# Patient Record
Sex: FEMALE | Race: WHITE | NOT HISPANIC OR LATINO | Employment: OTHER | URBAN - METROPOLITAN AREA
[De-identification: names, ages, dates, MRNs, and addresses within clinical notes are randomized per-mention and may not be internally consistent; named-entity substitution may affect disease eponyms.]

---

## 2024-02-02 PROBLEM — I48.91 ATRIAL FIBRILLATION (MULTI): Status: ACTIVE | Noted: 2024-02-02

## 2024-02-02 PROBLEM — I47.19 AVNRT (AV NODAL RE-ENTRY TACHYCARDIA) (CMS-HCC): Status: ACTIVE | Noted: 2024-02-02

## 2024-02-02 PROBLEM — I65.23 BILATERAL CAROTID ARTERY STENOSIS: Status: ACTIVE | Noted: 2024-02-02

## 2024-02-02 PROBLEM — R07.9 CHEST PAIN: Status: ACTIVE | Noted: 2024-02-02

## 2024-02-02 PROBLEM — I35.1 AORTIC VALVE REGURGITATION, ACQUIRED: Status: ACTIVE | Noted: 2024-02-02

## 2024-02-02 PROBLEM — R00.2 PALPITATIONS: Status: ACTIVE | Noted: 2024-02-02

## 2024-02-02 PROBLEM — K27.9 PUD (PEPTIC ULCER DISEASE): Status: ACTIVE | Noted: 2024-02-02

## 2024-02-02 PROBLEM — K21.9 GERD (GASTROESOPHAGEAL REFLUX DISEASE): Status: ACTIVE | Noted: 2024-02-02

## 2024-02-02 PROBLEM — I48.92 ATRIAL FLUTTER (MULTI): Status: ACTIVE | Noted: 2024-02-02

## 2024-02-02 RX ORDER — VIT C/E/ZN/COPPR/LUTEIN/ZEAXAN 250MG-90MG
25 CAPSULE ORAL DAILY
COMMUNITY

## 2024-02-02 RX ORDER — PRIMIDONE 50 MG/1
50 TABLET ORAL
COMMUNITY

## 2024-02-02 RX ORDER — MULTIVITAMIN
1 TABLET ORAL DAILY
COMMUNITY
End: 2024-04-10 | Stop reason: WASHOUT

## 2024-02-02 RX ORDER — ATENOLOL 25 MG/1
25 TABLET ORAL
COMMUNITY
Start: 2022-03-30

## 2024-02-02 RX ORDER — POLYETHYLENE GLYCOL 3350 17 G/17G
17 POWDER, FOR SOLUTION ORAL
COMMUNITY

## 2024-02-02 RX ORDER — POTASSIUM CITRATE 10 MEQ/1
10 TABLET, EXTENDED RELEASE ORAL DAILY
COMMUNITY

## 2024-02-02 RX ORDER — MV-MIN/FA/VIT K/LUTEIN/ZEAXANT 200MCG-5MG
1 CAPSULE ORAL 2 TIMES DAILY
COMMUNITY

## 2024-02-02 RX ORDER — GLUCOSAMINE/CHONDR SU A SOD 750-600 MG
1 TABLET ORAL DAILY
COMMUNITY

## 2024-02-02 RX ORDER — LANOLIN ALCOHOL/MO/W.PET/CERES
400 CREAM (GRAM) TOPICAL DAILY
COMMUNITY

## 2024-02-26 ENCOUNTER — OFFICE VISIT (OUTPATIENT)
Dept: CARDIOLOGY | Facility: CLINIC | Age: 79
End: 2024-02-26
Payer: MEDICARE

## 2024-02-26 VITALS
DIASTOLIC BLOOD PRESSURE: 60 MMHG | HEIGHT: 65 IN | SYSTOLIC BLOOD PRESSURE: 116 MMHG | WEIGHT: 124.6 LBS | BODY MASS INDEX: 20.76 KG/M2 | HEART RATE: 60 BPM

## 2024-02-26 DIAGNOSIS — R42 VERTIGO: ICD-10-CM

## 2024-02-26 DIAGNOSIS — K21.9 GASTROESOPHAGEAL REFLUX DISEASE, UNSPECIFIED WHETHER ESOPHAGITIS PRESENT: ICD-10-CM

## 2024-02-26 DIAGNOSIS — R55 NEAR SYNCOPE: ICD-10-CM

## 2024-02-26 DIAGNOSIS — N20.0 NEPHROLITHIASIS: ICD-10-CM

## 2024-02-26 DIAGNOSIS — K27.9 PUD (PEPTIC ULCER DISEASE): ICD-10-CM

## 2024-02-26 DIAGNOSIS — G25.0 ESSENTIAL TREMOR: ICD-10-CM

## 2024-02-26 DIAGNOSIS — I65.23 BILATERAL CAROTID ARTERY STENOSIS: ICD-10-CM

## 2024-02-26 DIAGNOSIS — R06.02 SHORTNESS OF BREATH: ICD-10-CM

## 2024-02-26 DIAGNOSIS — I47.19 AVNRT (AV NODAL RE-ENTRY TACHYCARDIA) (CMS-HCC): ICD-10-CM

## 2024-02-26 DIAGNOSIS — I48.92 ATRIAL FLUTTER, UNSPECIFIED TYPE (MULTI): ICD-10-CM

## 2024-02-26 DIAGNOSIS — I35.1 AORTIC VALVE REGURGITATION, ACQUIRED: ICD-10-CM

## 2024-02-26 DIAGNOSIS — R07.9 CHEST PAIN, UNSPECIFIED TYPE: ICD-10-CM

## 2024-02-26 DIAGNOSIS — Z78.9 NEVER SMOKED TOBACCO: ICD-10-CM

## 2024-02-26 PROCEDURE — 99214 OFFICE O/P EST MOD 30 MIN: CPT | Performed by: INTERNAL MEDICINE

## 2024-02-26 PROCEDURE — 1159F MED LIST DOCD IN RCRD: CPT | Performed by: INTERNAL MEDICINE

## 2024-02-26 PROCEDURE — 1036F TOBACCO NON-USER: CPT | Performed by: INTERNAL MEDICINE

## 2024-02-26 RX ORDER — ZINC GLUCONATE 100 MG
100 TABLET ORAL DAILY
COMMUNITY

## 2024-02-26 RX ORDER — CHOLECALCIFEROL (VITAMIN D3) 125 MCG
3000 CAPSULE ORAL
COMMUNITY

## 2024-02-26 NOTE — PROGRESS NOTES
CARDIOLOGY OFFICE VISIT      CHIEF COMPLAINT  Chief Complaint   Patient presents with    Follow-up     Patient presents with SOB and fatigue off and on.  She states that about a month ago she had an episode while exercising she got SOB and had to sit down, this happened again on Friday.        HISTORY OF PRESENT ILLNESS  The patient is a 78-year-old  female with past medical history significant for AV noah reentrant tachycardia, status post radiofrequency ablation 2004, atrial flutter, status post posterior corridor radiofrequency ablation 2005, who presents for follow-up cardiovascular care.     Patient has had a change in her functional status recently.  2 months ago while exercising she developed dyspnea and lightheadedness.  She felt near syncopal.  Last Friday she had recurrent dyspnea with exercising only 10 minutes.  She feels this is a significant change in her functional status.  This morning she felt shaky and was having some chest heaviness.  Denies palpitations, nausea, vomiting, edema, claudication.  She currently drinks approximately 64 ounces of water daily.  Home blood pressure readings are normal.     REVIEW OF SYSTEMS: Negative, noncontributory or as previously mentioned x 12 systems.      Past medical history:  As above plus  Nephrolithiasis  Essential tremor  Gastric soft reflux disease  Vertigo        PAST SURGICAL HISTORY   1. Lumbar diskectomy.  2. Varicose vein stripping.  3. Tonsils and adenoidectomy.  4. Toe surgery for bone spur.   5. Laser lithotripsy     SOCIAL HISTORY: Denies tobacco use. Occasional alcohol use.      FAMILY HISTORY: Mom alive at 90 with angina. Dad alive at 88 with several  cerebrovascular accidents, status post pacemaker.      ASSESSMENT:   Chest pain  Dyspnea  Near syncope  Atrioventricular noah reentrant tachycardia, status post radiofrequency ablation, 2004.  Atrial flutter, status post posterior corridor radiofrequency ablation, 2005.  Aortic valve  regurgitation  Carotid artery stenosis.  Peptic ulcer disease/bleeding ulcer status post clip July 2021  Essential tremor.  History of vertigo.  Gastroesophageal reflux disease - Dr Carlton [facility].  Nephrolithiasis -Dr Colon [facility]  Intolerance to aspirin due to bleeding ulcer       RECOMMENDATIONS:   To further evaluate recent symptoms obtain cardiac CTA of the coronary arteries, echocardiogram, 2-week event monitor.  Hold off exercising at this time.  Follow-up after testing.     Please excuse any errors in grammar or translation related to this dictation. Voice recognition software was utilized to prepare this document.    Recent Cardiovascular Testing:  The following results have been reviewed and updated.     CRD: 5/5/09  1. Mild carotid disease.     MPX: 5/6/09  1. Normal.  2. EF 75%.     Labs: 5/25/23  1.T C 241, , Trig 58, LDL 99  .     CTA Cardiac C. Score 6/7/16  1.Normal Coronary art.  2.Calcium score 0.     PATRICK: 11/13/18  1. No evidence of dysrhythmia.  2. No symptoms.     Echo: 11/13/18  1. EF 55%.  2. 1+ AI.     VITALS  Vitals:    02/26/24 1311   BP: 116/60   Pulse: 60     Wt Readings from Last 4 Encounters:   02/26/24 56.5 kg (124 lb 9.6 oz)   06/05/23 57.4 kg (126 lb 9 oz)   06/01/22 57.3 kg (126 lb 5 oz)       PHYSICAL EXAM:  GENERAL:  Well developed, well nourished, in no acute distress.  CHEST:  Symmetric and nontender.  NEURO/PSYCH:  Alert and oriented times three with approppriate behavior and responses.  NECK:  Supple, no JVD, no bruit.  LUNGS:  Clear to auscultation bilaterally, normal respiratory effort.  HEART:  Rate and rhythm regular with no evident murmur, no gallop appreciated.    cThere are no rubs, clicks or heaves.  EXTREMITIES:  Warm with good color, no clubbing or cyanosis.  There is no edema noted.  PERIPHERAL VASCULAR:  Pulses present and equally palpable; 2+ throughout.    ASSESSMENT AND PLAN  Diagnoses and all orders for this visit:  AVNRT (AV noah re-entry  tachycardia)  -     Holter or Event Cardiac Monitor; Future  Atrial flutter, unspecified type (CMS/HCC)  Aortic valve regurgitation, acquired  Bilateral carotid artery stenosis  PUD (peptic ulcer disease)  Essential tremor  Vertigo  Gastroesophageal reflux disease, unspecified whether esophagitis present  Nephrolithiasis  BMI 21.0-21.9, adult  Never smoked tobacco  Chest pain, unspecified type  -     CT angio coronary art with heartflow if score >30%; Future  -     Transthoracic Echo (TTE) Complete; Future  -     Holter or Event Cardiac Monitor; Future  -     Creatinine; Future  Shortness of breath  -     CT angio coronary art with heartflow if score >30%; Future  -     Transthoracic Echo (TTE) Complete; Future  -     Holter or Event Cardiac Monitor; Future  -     Creatinine; Future  Near syncope  -     CT angio coronary art with heartflow if score >30%; Future  -     Transthoracic Echo (TTE) Complete; Future  -     Holter or Event Cardiac Monitor; Future  -     Creatinine; Future      Past Medical History  Past Medical History:   Diagnosis Date    Abnormal ECG     Sleep apnea        Social History  Social History     Tobacco Use    Smoking status: Never    Smokeless tobacco: Never   Substance Use Topics    Alcohol use: Yes     Comment: Only have a drink several times a year    Drug use: Never       Family History     Family History   Problem Relation Name Age of Onset    Hypertension Mother Celia Ocampo     Other (angina pectoris) Mother Celia Ocampo     Angina Mother Celia Ocampo     Stroke Father Ty Maciasprashant     Other (CABG) Father Ty WOMACK Yeimyjimprashant     Other (heart problem) Father Ty Maciasprashant     Other (PTCA) Father Ty Maciasprashant     Diabetes type I Father's Sister Sharmila         Allergies:  Allergies   Allergen Reactions    Ciprofloxacin Unknown    Propranolol Unknown        Outpatient Medications:  Current Outpatient Medications   Medication Instructions    atenolol (TENORMIN) 25  "mg, oral,  TAKE 1 TABLET in the morning and 1/2 tablet in the evening    biotin 2,500 mcg capsule 1 capsule, oral, Daily    CALCIUM CITRATE-VITAMIN D3 ORAL oral, TAKE PO AS DIRECTED PER PACKAGE INSTRUCTIONS.    cholecalciferol (VITAMIN D-3) 25 mcg, oral, Daily    DIETARY SUPPLEMENT ORAL 1 capsule, oral, Daily, As needed    L. acidophilus/Bifid. animalis 32 billion cell capsule 1 capsule, oral, Daily    lactase (LACTAID) 3,000 Units, oral, 3 times daily with meals    magnesium oxide (MAG-OX) 400 mg, oral, Daily    multivitamin tablet 1 tablet, oral, Daily    mv-min-FA-vit K-lutein-zeaxant (PreserVision AREDS 2 Plus MV) 200 mcg-15 mcg- 5 mg-1 mg capsule 1 capsule, oral, Daily    polyethylene glycol (MIRALAX) 17 g, oral, Daily RT    potassium citrate CR (Urocit-K-10) 10 mEq ER tablet 10 mEq, oral, Daily    primidone (MYSOLINE) 50 mg, oral,  Take 2 tablets at lunch and 4 tabs at HS    pseudoephed/acetaminoph/diphen (MEDI-TABS ALLERGY SINUS ORAL) 1 tablet, oral, Daily, As needed    SIMETHICONE ORAL 125 mg, oral, Daily, As needed    vitamin k 100 mcg, oral, Daily        Recent Lab Results:    CMP:    No results found for: \"NA\", \"K\", \"CL\", \"CO2\", \"BUN\", \"CREATININE\", \"AGRATIO\", \"GLUCOSE\", \"GLU\", \"CALCIUM\"    Magnesium:    No results found for: \"MG\"    Lipid Profile:    No results found for: \"CHLPL\", \"TRIG\", \"HDL\", \"LDLCALC\", \"LDLDIRECT\"    TSH:    No results found for: \"TSH\"    BNP:   No results found for: \"BNP\"     PT/INR:    No results found for: \"PROTIME\", \"INR\"    HgBA1c:    No results found for: \"HGBA1C\"    BMP:  No results found for: \"NA\", \"K\", \"CL\", \"CO2\", \"BUN\", \"CREATININE\", \"GLU\"    CBC:  No results found for: \"WBC\", \"RBC\", \"HGB\", \"HCT\", \"MCV\", \"MCH\", \"MCHC\", \"RDW\", \"PLT\", \"MPV\"    Cardiac Enzymes:    No results found for: \"TROPHS\"    Hepatic Function Panel:    No results found for: \"ALKPHOS\", \"ALT\", \"AST\", \"PROT\", \"BILITOT\", \"BILIDIR\"        Edinson Paige, DO        "

## 2024-02-26 NOTE — PATIENT INSTRUCTIONS
Patient to follow up after testing with Dr. Royal DO     Office will arrange Catscan Angiogram of Coronaries in near future.   Please complete lab work prior to testing- orders in system.     Office will also arrange Echo and 48hour holter monitor in near future.     Encouraged to monitor blood pressure.     No other changes today.   Continue same medications and treatments.   Patient educated on proper medication use.   Patient educated on risk factor modification.   Please bring any lab results from other providers / physicians to your next appointment.     Please bring all medicines, vitamins, and herbal supplements with you when you come to the office.     Prescriptions will not be filled unless you are compliant with your follow up appointments or have a follow up appointment scheduled as per instruction of your physician. Refills should be requested at the time of your visit.    INaveen RN am scribing for and in the presence of Dr. Edinson Paige DO

## 2024-03-04 ENCOUNTER — LAB (OUTPATIENT)
Dept: LAB | Facility: LAB | Age: 79
End: 2024-03-04
Payer: MEDICARE

## 2024-03-04 DIAGNOSIS — R55 NEAR SYNCOPE: ICD-10-CM

## 2024-03-04 DIAGNOSIS — R07.9 CHEST PAIN, UNSPECIFIED TYPE: ICD-10-CM

## 2024-03-04 DIAGNOSIS — R06.02 SHORTNESS OF BREATH: ICD-10-CM

## 2024-03-04 LAB
CREAT SERPL-MCNC: 0.81 MG/DL (ref 0.5–1.05)
EGFRCR SERPLBLD CKD-EPI 2021: 75 ML/MIN/1.73M*2

## 2024-03-04 PROCEDURE — 82565 ASSAY OF CREATININE: CPT

## 2024-03-04 PROCEDURE — 36415 COLL VENOUS BLD VENIPUNCTURE: CPT

## 2024-03-08 ENCOUNTER — HOSPITAL ENCOUNTER (OUTPATIENT)
Dept: RADIOLOGY | Facility: CLINIC | Age: 79
Discharge: HOME | End: 2024-03-08
Payer: MEDICARE

## 2024-03-08 VITALS
HEART RATE: 67 BPM | OXYGEN SATURATION: 100 % | SYSTOLIC BLOOD PRESSURE: 127 MMHG | DIASTOLIC BLOOD PRESSURE: 72 MMHG | RESPIRATION RATE: 20 BRPM

## 2024-03-08 DIAGNOSIS — R06.02 SHORTNESS OF BREATH: ICD-10-CM

## 2024-03-08 DIAGNOSIS — R55 NEAR SYNCOPE: ICD-10-CM

## 2024-03-08 DIAGNOSIS — R07.9 CHEST PAIN, UNSPECIFIED TYPE: ICD-10-CM

## 2024-03-08 PROCEDURE — 2550000001 HC RX 255 CONTRASTS: Performed by: INTERNAL MEDICINE

## 2024-03-08 PROCEDURE — 75574 CT ANGIO HRT W/3D IMAGE: CPT

## 2024-03-08 PROCEDURE — 75574 CT ANGIO HRT W/3D IMAGE: CPT | Performed by: INTERNAL MEDICINE

## 2024-03-08 PROCEDURE — 2500000001 HC RX 250 WO HCPCS SELF ADMINISTERED DRUGS (ALT 637 FOR MEDICARE OP): Performed by: INTERNAL MEDICINE

## 2024-03-08 RX ORDER — NITROGLYCERIN 400 UG/1
2 SPRAY ORAL ONCE
Status: COMPLETED | OUTPATIENT
Start: 2024-03-08 | End: 2024-03-08

## 2024-03-08 RX ADMIN — IOHEXOL 80 ML: 350 INJECTION, SOLUTION INTRAVENOUS at 14:11

## 2024-03-08 RX ADMIN — NITROGLYCERIN 2 SPRAY: 400 SPRAY ORAL at 13:28

## 2024-03-15 ENCOUNTER — ANCILLARY PROCEDURE (OUTPATIENT)
Dept: CARDIOLOGY | Facility: CLINIC | Age: 79
End: 2024-03-15
Payer: MEDICARE

## 2024-03-15 ENCOUNTER — HOSPITAL ENCOUNTER (OUTPATIENT)
Dept: CARDIOLOGY | Facility: CLINIC | Age: 79
Discharge: HOME | End: 2024-03-15
Payer: MEDICARE

## 2024-03-15 VITALS
BODY MASS INDEX: 20.66 KG/M2 | HEIGHT: 65 IN | SYSTOLIC BLOOD PRESSURE: 118 MMHG | WEIGHT: 124 LBS | DIASTOLIC BLOOD PRESSURE: 70 MMHG

## 2024-03-15 DIAGNOSIS — R06.02 SHORTNESS OF BREATH: ICD-10-CM

## 2024-03-15 DIAGNOSIS — R07.9 CHEST PAIN, UNSPECIFIED TYPE: ICD-10-CM

## 2024-03-15 DIAGNOSIS — R55 NEAR SYNCOPE: ICD-10-CM

## 2024-03-15 DIAGNOSIS — I47.19 AVNRT (AV NODAL RE-ENTRY TACHYCARDIA): ICD-10-CM

## 2024-03-15 LAB
AORTIC VALVE MEAN GRADIENT: 4 MMHG
AORTIC VALVE PEAK VELOCITY: 1.3 M/S
AV PEAK GRADIENT: 6.8 MMHG
AVA (PEAK VEL): 2.13 CM2
AVA (VTI): 2.14 CM2
EJECTION FRACTION APICAL 4 CHAMBER: 58.1
LEFT VENTRICLE INTERNAL DIMENSION DIASTOLE: 4.49 CM (ref 3.5–6)
LEFT VENTRICULAR OUTFLOW TRACT DIAMETER: 1.9 CM
MITRAL VALVE E/A RATIO: 0.97
MITRAL VALVE E/E' RATIO: 10.3
RIGHT VENTRICLE PEAK SYSTOLIC PRESSURE: 28 MMHG

## 2024-03-15 PROCEDURE — 93306 TTE W/DOPPLER COMPLETE: CPT

## 2024-03-15 PROCEDURE — 93227 XTRNL ECG REC<48 HR R&I: CPT | Performed by: INTERNAL MEDICINE

## 2024-03-15 PROCEDURE — 93225 XTRNL ECG REC<48 HRS REC: CPT | Performed by: INTERNAL MEDICINE

## 2024-03-15 PROCEDURE — 93306 TTE W/DOPPLER COMPLETE: CPT | Performed by: INTERNAL MEDICINE

## 2024-03-19 ENCOUNTER — APPOINTMENT (OUTPATIENT)
Dept: RADIOLOGY | Facility: CLINIC | Age: 79
End: 2024-03-19
Payer: MEDICARE

## 2024-03-21 ENCOUNTER — TELEPHONE (OUTPATIENT)
Dept: CARDIOLOGY | Facility: CLINIC | Age: 79
End: 2024-03-21
Payer: MEDICARE

## 2024-03-21 LAB — BODY SURFACE AREA: 1.6 M2

## 2024-03-21 PROCEDURE — 93227 XTRNL ECG REC<48 HR R&I: CPT | Performed by: INTERNAL MEDICINE

## 2024-03-21 NOTE — TELEPHONE ENCOUNTER
Pt left message asking for return call with testing results.  Pt states that she had testing done on 3/8/2024 and 3/15/2024.    Pt had CCTA on 3/8/2024 and Echo on 3/15/2024.    Routed to Starr BERGERON LPN

## 2024-03-22 NOTE — TELEPHONE ENCOUNTER
I called patient and advised. She verbalized understanding.   I have personally evaluated and examined the patient. The Attending was available to me as a supervising provider if needed.

## 2024-04-10 ENCOUNTER — OFFICE VISIT (OUTPATIENT)
Dept: CARDIOLOGY | Facility: CLINIC | Age: 79
End: 2024-04-10
Payer: MEDICARE

## 2024-04-10 VITALS
DIASTOLIC BLOOD PRESSURE: 70 MMHG | HEIGHT: 65 IN | WEIGHT: 128 LBS | BODY MASS INDEX: 21.33 KG/M2 | HEART RATE: 60 BPM | SYSTOLIC BLOOD PRESSURE: 138 MMHG

## 2024-04-10 DIAGNOSIS — I48.0 PAROXYSMAL ATRIAL FIBRILLATION (MULTI): ICD-10-CM

## 2024-04-10 DIAGNOSIS — I47.19 AVNRT (AV NODAL RE-ENTRY TACHYCARDIA) (CMS-HCC): ICD-10-CM

## 2024-04-10 DIAGNOSIS — I65.23 BILATERAL CAROTID ARTERY STENOSIS: ICD-10-CM

## 2024-04-10 DIAGNOSIS — Z78.9 NEVER SMOKED TOBACCO: ICD-10-CM

## 2024-04-10 DIAGNOSIS — R06.02 SHORTNESS OF BREATH: ICD-10-CM

## 2024-04-10 DIAGNOSIS — R55 NEAR SYNCOPE: ICD-10-CM

## 2024-04-10 PROCEDURE — 99214 OFFICE O/P EST MOD 30 MIN: CPT | Performed by: INTERNAL MEDICINE

## 2024-04-10 PROCEDURE — 1036F TOBACCO NON-USER: CPT | Performed by: INTERNAL MEDICINE

## 2024-04-10 PROCEDURE — 1159F MED LIST DOCD IN RCRD: CPT | Performed by: INTERNAL MEDICINE

## 2024-04-10 RX ORDER — BISMUTH SUBSALICYLATE 262 MG
1 TABLET,CHEWABLE ORAL DAILY
COMMUNITY

## 2024-04-10 NOTE — PROGRESS NOTES
CARDIOLOGY OFFICE VISIT      CHIEF COMPLAINT  Chief Complaint   Patient presents with    Results     FOLLOW UP TO REVIEW RECENT TESTING RESULTS        HISTORY OF PRESENT ILLNESS  The patient is a 78-year-old  female with past medical history significant for AV noah reentrant tachycardia, status post radiofrequency ablation 2004, atrial flutter, status post posterior corridor radiofrequency ablation 2005, who presents for follow-up cardiovascular care.     Office visit February 26, 2024  Patient has had a change in her functional status recently.  2 months ago while exercising she developed dyspnea and lightheadedness.  She felt near syncopal.  Last Friday she had recurrent dyspnea with exercising only 10 minutes.  She feels this is a significant change in her functional status.  This morning she felt shaky and was having some chest heaviness.  Denies palpitations, nausea, vomiting, edema, claudication.  She currently drinks approximately 64 ounces of water daily.  Home blood pressure readings are normal.    Office visit April 10, 2024  Patient continues to have fatigue and dyspnea.  Dyspnea is associated with palpitations on exertion.  Symptoms resolved with rest.  Denies chest pain, nausea, vomiting, dizziness, near-syncope, ny syncope, edema.  I reviewed cardiac CT, echocardiogram, and Holter monitor findings with patient and .     REVIEW OF SYSTEMS: Negative, noncontributory or as previously mentioned x 12 systems.      Past medical history:  As above plus  Nephrolithiasis  Essential tremor  Gastric soft reflux disease  Vertigo        PAST SURGICAL HISTORY   1. Lumbar diskectomy.  2. Varicose vein stripping.  3. Tonsils and adenoidectomy.  4. Toe surgery for bone spur.   5. Laser lithotripsy     SOCIAL HISTORY: Denies tobacco use. Occasional alcohol use.      FAMILY HISTORY: Mom alive at 90 with angina. Dad alive at 88 with several  cerebrovascular accidents, status post pacemaker.       ASSESSMENT:     Dyspnea  Atrioventricular noah reentrant tachycardia, status post radiofrequency ablation, 2004.  Atrial flutter, status post posterior corridor radiofrequency ablation, 2005.  Coronary artery disease-mild on CCTA March 11, 2024, CAC 53, 46 percentile  Aortic valve regurgitation  Carotid artery stenosis.  Peptic ulcer disease/bleeding ulcer status post clip July 2021  Essential tremor.  History of vertigo.  Gastroesophageal reflux disease - Dr Carlton [facility].  Nephrolithiasis -Dr Colon [facility]  Intolerance to aspirin due to bleeding ulcer        RECOMMENDATIONS:   There were no significant cardiac abnormalities found on cardiac CTA, echocardiogram, or event monitor to explain patient's dyspnea.  We reviewed testing in detail.  No further cardiac testing required at this time.  At this time advise pulmonary consultation.  The patient will confer with her PCP at [facility] for pulmonary referral and further noncardiac evaluation for presenting symptoms.  If symptoms become more severe advise evaluation in emergency room.  Follow-up with myself in 1 year.    Please excuse any errors in grammar or translation related to this dictation. Voice recognition software was utilized to prepare this document.    Recent Cardiovascular Testing:  The following results have been reviewed and updated.     CRD: 5/5/09  1. Mild carotid disease.     MPX: 5/6/09  1. Normal.  2. EF 55-60%.     Labs: 5/25/23  1.T C 241, , Trig 58, LDL 99  .     CTA Cardiac C. Score 6/7/16  1.Normal Coronary art.  2.Calcium score 0.     PATRICK: 11/13/18  1. No evidence of dysrhythmia.  2. No symptoms.     Echo: 3/15/24  1. EF 55%.    Holter  3/15/24  1.NSR,  67 BPM averagea  2.Min 50 bpm,max. 129 bpm  3.3 beat run atrial tachycardiac 156 bpm  4.Rare PV ectopic beats totaling 35.    CT Angio Coronary 3/11/24  1.-Mild Diffuse CAD  2.Coronary calcium score is 53. 46 th percentile for age ,race and gender.            VITALS  Vitals:    04/10/24  1444   BP: 138/70   Pulse: 60     Wt Readings from Last 4 Encounters:   04/10/24 58.1 kg (128 lb)   03/15/24 56.2 kg (124 lb)   02/26/24 56.5 kg (124 lb 9.6 oz)   06/05/23 57.4 kg (126 lb 9 oz)       PHYSICAL EXAM:  GENERAL:  Well developed, well nourished, in no acute distress.  CHEST:  Symmetric and nontender.  NEURO/PSYCH:  Alert and oriented times three with approppriate behavior and responses.  NECK:  Supple, no JVD, no bruit.  LUNGS:  Clear to auscultation bilaterally, normal respiratory effort.  HEART:  Rate and rhythm REGULAR with no evident murmur, no gallop appreciated.    There are no rubs, clicks or heaves.  EXTREMITIES:  Warm with good color, no clubbing or cyanosis.  There is no edema noted.  PERIPHERAL VASCULAR:  Pulses present and equally palpable; 2+ throughout.    ASSESSMENT AND PLAN  Problem List Items Addressed This Visit          Cardiac and Vasculature    Atrial fibrillation (CMS/HCC)    AVNRT (AV noah re-entry tachycardia) (CMS/HCC)    Bilateral carotid artery stenosis       Endocrine/Metabolic    BMI 21.0-21.9, adult       Pulmonary and Pneumonias    Shortness of breath       Symptoms and Signs    Near syncope       Tobacco    Never smoked tobacco       Past Medical History  Past Medical History:   Diagnosis Date    Abnormal ECG     Sleep apnea        Social History  Social History     Tobacco Use    Smoking status: Never    Smokeless tobacco: Never   Substance Use Topics    Alcohol use: Yes     Comment: Only have a drink several times a year    Drug use: Never       Family History     Family History   Problem Relation Name Age of Onset    Hypertension Mother Celia Gaffneyfreya     Other (angina pectoris) Mother Celia Ocampo     Angina Mother Celia Ocampo     Stroke Father Ty Ocampo     Other (CABG) Father Ty Ocampo     Other (heart problem) Father Ty Ocampo     Other (PTCA) Father Ty Ocampo     Diabetes type I Father's Sister Sharmila          Allergies:  Allergies   Allergen Reactions    Ciprofloxacin Unknown    Propranolol Unknown        Outpatient Medications:  Current Outpatient Medications   Medication Instructions    atenolol (TENORMIN) 25 mg, oral,  TAKE 1 TABLET in the morning and 1/2 tablet in the evening    biotin 2,500 mcg capsule 1 capsule, oral, Daily    CALCIUM CITRATE-VITAMIN D3 ORAL oral, TAKE PO AS DIRECTED PER PACKAGE INSTRUCTIONS.    cholecalciferol (VITAMIN D-3) 25 mcg, oral, Daily    L. acidophilus/Bifid. animalis 32 billion cell capsule 1 capsule, oral, Daily    lactase (LACTAID) 3,000 Units, oral, 3 times daily with meals, prn    magnesium oxide (MAG-OX) 400 mg, oral, Daily    multivitamin tablet 1 tablet, oral, Daily    mv-min-FA-vit K-lutein-zeaxant (PreserVision AREDS 2 Plus MV) 200 mcg-15 mcg- 5 mg-1 mg capsule 1 capsule, oral, 2 times daily    polyethylene glycol (MIRALAX) 17 g, oral, Daily RT, prn    potassium citrate CR (Urocit-K-10) 10 mEq ER tablet 10 mEq, oral, Daily    primidone (MYSOLINE) 50 mg, oral,  Take 2 tablets at lunch and 4 tabs at HS    pseudoephed/acetaminoph/diphen (MEDI-TABS ALLERGY SINUS ORAL) 1 tablet, oral, Daily, As needed    SIMETHICONE ORAL 125 mg, oral, Daily, As needed    vitamin k 100 mcg, oral, Daily        Recent Lab Results:    CMP:    Lab Results   Component Value Date    CREATININE 0.81 03/04/2024       BMP:  Lab Results   Component Value Date    CREATININE 0.81 03/04/2024           Edinson Paige DO

## 2024-04-22 ENCOUNTER — TELEPHONE (OUTPATIENT)
Dept: CARDIOLOGY | Facility: CLINIC | Age: 79
End: 2024-04-22
Payer: MEDICARE

## 2024-04-22 NOTE — TELEPHONE ENCOUNTER
Patient left VM on clinical line stating she woke up in the middle of the night to her heart rate at 115bpm and it increased to 133bpm. No other information or symptoms were left on the VM. Will route to Dr. Paige's nurse.

## 2024-06-13 ENCOUNTER — OFFICE VISIT (OUTPATIENT)
Dept: CARDIOLOGY | Facility: CLINIC | Age: 79
End: 2024-06-13
Payer: MEDICARE

## 2024-06-13 VITALS
WEIGHT: 126.1 LBS | HEART RATE: 64 BPM | SYSTOLIC BLOOD PRESSURE: 130 MMHG | BODY MASS INDEX: 21.31 KG/M2 | DIASTOLIC BLOOD PRESSURE: 70 MMHG

## 2024-06-13 DIAGNOSIS — R42 VERTIGO: ICD-10-CM

## 2024-06-13 DIAGNOSIS — R06.02 SHORTNESS OF BREATH: ICD-10-CM

## 2024-06-13 DIAGNOSIS — R42 DIZZINESS: ICD-10-CM

## 2024-06-13 DIAGNOSIS — I65.23 BILATERAL CAROTID ARTERY STENOSIS: ICD-10-CM

## 2024-06-13 DIAGNOSIS — G25.0 ESSENTIAL TREMOR: ICD-10-CM

## 2024-06-13 DIAGNOSIS — N20.0 NEPHROLITHIASIS: ICD-10-CM

## 2024-06-13 DIAGNOSIS — K27.9 PUD (PEPTIC ULCER DISEASE): ICD-10-CM

## 2024-06-13 DIAGNOSIS — I35.1 AORTIC VALVE REGURGITATION, ACQUIRED: ICD-10-CM

## 2024-06-13 DIAGNOSIS — I47.19 AVNRT (AV NODAL RE-ENTRY TACHYCARDIA) (CMS-HCC): ICD-10-CM

## 2024-06-13 DIAGNOSIS — Z78.9 NEVER SMOKED TOBACCO: ICD-10-CM

## 2024-06-13 DIAGNOSIS — R00.2 PALPITATIONS: Primary | ICD-10-CM

## 2024-06-13 DIAGNOSIS — R55 NEAR SYNCOPE: ICD-10-CM

## 2024-06-13 DIAGNOSIS — I48.92 ATRIAL FLUTTER, UNSPECIFIED TYPE (MULTI): ICD-10-CM

## 2024-06-13 PROCEDURE — 1159F MED LIST DOCD IN RCRD: CPT | Performed by: INTERNAL MEDICINE

## 2024-06-13 PROCEDURE — 99214 OFFICE O/P EST MOD 30 MIN: CPT | Performed by: INTERNAL MEDICINE

## 2024-06-13 PROCEDURE — 1036F TOBACCO NON-USER: CPT | Performed by: INTERNAL MEDICINE

## 2024-06-13 RX ORDER — ATENOLOL 25 MG/1
TABLET ORAL
Qty: 150 TABLET | Refills: 3 | Status: SHIPPED | OUTPATIENT
Start: 2024-06-13

## 2024-06-13 NOTE — PATIENT INSTRUCTIONS
Continue same medications/treatment.  Patient educated on proper medication use.  Patient educated on risk factor modification.  Please bring any lab results from other providers/physicians to your next appointment.    Please bring all medicines, vitamins, and herbal supplements with you when you come to the office.    Prescriptions will not be filled unless you are compliant with your follow up appointments or have a follow up appointment scheduled as per instruction of your physician. Refills should be requested at the time of your visit.    BeatroboA monitor is the device Dr. Paige would like you to obtain to monitor your heart rhythm.   TAKE an extra 25mg atenolol as needed for break through palpitations.   REFER to Dr. Saundra Cordero with Electrophysiology.   Keep follow up appointment with Dr. Paige in April I, EMERALD SERNA RN AM SCRIBING FOR AND IN THE PRESENCE OF BRANDON PAIGE DO, FACC

## 2024-06-13 NOTE — PROGRESS NOTES
CARDIOLOGY OFFICE VISIT      CHIEF COMPLAINT  Chief Complaint   Patient presents with    Follow-up     Patient c/o elevated heart rate that woke her up during the night, none today        HISTORY OF PRESENT ILLNESS  The patient is a 78-year-old  female with past medical history significant for AV noah reentrant tachycardia, status post radiofrequency ablation 2004, atrial flutter, status post posterior corridor radiofrequency ablation 2005, who presents for follow-up cardiovascular care.     Office visit February 26, 2024  Patient has had a change in her functional status recently.  2 months ago while exercising she developed dyspnea and lightheadedness.  She felt near syncopal.  Last Friday she had recurrent dyspnea with exercising only 10 minutes.  She feels this is a significant change in her functional status.  This morning she felt shaky and was having some chest heaviness.  Denies palpitations, nausea, vomiting, edema, claudication.  She currently drinks approximately 64 ounces of water daily.  Home blood pressure readings are normal.     Office visit April 10, 2024  Patient continues to have fatigue and dyspnea.  Dyspnea is associated with palpitations on exertion.  Symptoms resolved with rest.  Denies chest pain, nausea, vomiting, dizziness, near-syncope, ny syncope, edema.  I reviewed cardiac CT, echocardiogram, and Holter monitor findings with patient and .    Office visit June 13, 2024  Patient woke up from bed on April 22 with palpitations at 1:30 AM.  The symptoms lasted 60 minutes.  Her heart rate was 141 bpm.  She went to her PCP and had an EKG few days later without abnormality.  She is currently undergoing pulmonary evaluation at [facility].  She may have mild asthma and is going through additional testing.  She continues to have chest tightness that is variable.  She has occasional lightheadedness and at times is near syncopal.  She has intermittent dyspnea.     REVIEW OF SYSTEMS:  Negative, noncontributory or as previously mentioned x 12 systems.      Past medical history:  As above plus  Nephrolithiasis  Essential tremor  Gastric soft reflux disease  Vertigo        PAST SURGICAL HISTORY   1. Lumbar diskectomy.  2. Varicose vein stripping.  3. Tonsils and adenoidectomy.  4. Toe surgery for bone spur.   5. Laser lithotripsy     SOCIAL HISTORY: Denies tobacco use. Occasional alcohol use.      FAMILY HISTORY: Mom alive at 90 with angina. Dad alive at 88 with several  cerebrovascular accidents, status post pacemaker.      ASSESSMENT:   Palpitations  Near syncope  Dyspnea- Dr Nazario Velázquez [facility]  Atrioventricular noah reentrant tachycardia, status post radiofrequency ablation, 2004.  Atrial flutter, status post posterior corridor radiofrequency ablation, 2005.  Coronary artery disease-mild on CCTA March 11, 2024, CAC 53, 46 percentile  Aortic valve regurgitation  Carotid artery stenosis.  Peptic ulcer disease/bleeding ulcer status post clip July 2021  Essential tremor.  History of vertigo.  Gastroesophageal reflux disease - Dr Carlton [facility].  Nephrolithiasis -Dr [facility]  Intolerance to aspirin due to bleeding ulcer        RECOMMENDATIONS:   I advised the patient get a home Kardia monitor for episodes of palpitations.  Will also advise that she take an additional atenolol 25 mg as needed palpitations. I did perform a 48-hour Holter monitor revealed no significant dysrhythmia.  Will refer to electrophysiology to consider loop recorder implantation for monitoring symptoms of palpitations and near syncope.She is undergoing pulmonary evaluation at this time.  Follow-up with myself as scheduled April 2025.     Please excuse any errors in grammar or translation related to this dictation. Voice recognition software was utilized to prepare this document.    Recent Cardiovascular Testing:  The following results have been reviewed and updated.     CRD: 5/5/09  1. Mild carotid disease.     MPX:  5/6/09  1. Normal.  2. EF 55-60%.     Labs: 5/25/23  1.T C 241, , Trig 58, LDL 99  .     CTA Cardiac C. Score 6/7/16  1.Normal Coronary art.  2.Calcium score 0.     PATRICK: 11/13/18  1. No evidence of dysrhythmia.  2. No symptoms.     Echo: 3/15/24  1. EF 55%.     48 hour Holter  3/15/24  1.NSR,  67 BPM averagea  2.Min 50 bpm,max. 129 bpm  3.3 beat run atrial tachycardiac 156 bpm  4.Rare PV ectopic beats totaling 35.     CT Angio Coronary 3/11/24  1.-Mild Diffuse CAD  2.Coronary calcium score is 53. 46 th percentile for age ,race and gender.    VITALS  Vitals:    06/13/24 0833   BP: 130/70   Pulse: 64     Wt Readings from Last 4 Encounters:   06/13/24 57.2 kg (126 lb 1.6 oz)   04/10/24 58.1 kg (128 lb)   03/15/24 56.2 kg (124 lb)   02/26/24 56.5 kg (124 lb 9.6 oz)       PHYSICAL EXAM:  GENERAL:  Well developed, well nourished, in no acute distress.  CHEST:  Symmetric and nontender.  NEURO/PSYCH:  Alert and oriented times three with approppriate behavior and responses.  NECK:  Supple, no JVD, no bruit.  LUNGS:  Clear to auscultation bilaterally, normal respiratory effort.  HEART:  Rate and rhythm regular with no evident murmur, no gallop appreciated.                   There are no rubs, clicks or heaves.  EXTREMITIES:  Warm with good color, no clubbing or cyanosis.  There is no edema noted.  PERIPHERAL VASCULAR:  Pulses present and equally palpable; 2+ throughout.    ASSESSMENT AND PLAN  Diagnoses and all orders for this visit:  Shortness of breath  AVNRT (AV noah re-entry tachycardia) (CMS-[facility])  Atrial flutter, unspecified type ([facility])  Essential tremor  Vertigo  Bilateral carotid artery stenosis  Aortic valve regurgitation, acquired  PUD (peptic ulcer disease)  Nephrolithiasis  Never smoked tobacco  BMI 21.0-21.9, adult  Palpitations      Past Medical History  Past Medical History:   Diagnosis Date    Abnormal ECG     Sleep apnea        Social History  Social History     Tobacco Use    Smoking status: Never     Smokeless tobacco: Never   Substance Use Topics    Alcohol use: Yes     Comment: Only have a drink several times a year    Drug use: Never       Family History     Family History   Problem Relation Name Age of Onset    Hypertension Mother Celia Ocampo     Other (angina pectoris) Mother Celia Ocampo     Angina Mother Celia Ocampo     Stroke Father Ty Ocampo     Other (CABG) Father Ty Ocampo     Other (heart problem) Father Ty Ocampo     Other (PTCA) Father Ty Ocampo     Diabetes type I Father's Sister Sharmila         Allergies:  Allergies   Allergen Reactions    Ciprofloxacin Unknown    Propranolol Unknown        Outpatient Medications:  Current Outpatient Medications   Medication Instructions    atenolol (TENORMIN) 25 mg, oral,  TAKE 1 TABLET in the morning and 1/2 tablet in the evening    biotin 2,500 mcg capsule 1 capsule, oral, Daily    CALCIUM CITRATE-VITAMIN D3 ORAL oral, TAKE PO AS DIRECTED PER PACKAGE INSTRUCTIONS.    cholecalciferol (VITAMIN D-3) 25 mcg, oral, Daily    L. acidophilus/Bifid. animalis 32 billion cell capsule 1 capsule, oral, Daily    lactase (LACTAID) 3,000 Units, oral, 3 times daily (morning, midday, late afternoon), prn    magnesium oxide (MAG-OX) 400 mg, oral, Daily    multivitamin tablet 1 tablet, oral, Daily    mv-min-FA-vit K-lutein-zeaxant (PreserVision AREDS 2 Plus MV) 200 mcg-15 mcg- 5 mg-1 mg capsule 1 capsule, oral, 2 times daily    polyethylene glycol (MIRALAX) 17 g, oral, Daily RT, prn    potassium citrate CR (Urocit-K-10) 10 mEq ER tablet 10 mEq, oral, Daily    primidone (MYSOLINE) 50 mg, oral,  Take 2 tablets at lunch and 4 tabs at HS    pseudoephed/acetaminoph/diphen (MEDI-TABS ALLERGY SINUS ORAL) 1 tablet, oral, Daily, As needed    SIMETHICONE ORAL 125 mg, oral, Daily, As needed    vitamin k 100 mcg, oral, Daily        Recent Lab Results:    CMP:    Lab Results   Component Value Date    CREATININE 0.81 03/04/2024       Magnesium:   "  No results found for: \"MG\"    Lipid Profile:    No results found for: \"CHLPL\", \"TRIG\", \"HDL\", \"LDLCALC\", \"LDLDIRECT\"    TSH:    No results found for: \"TSH\"    BNP:   No results found for: \"BNP\"     PT/INR:    No results found for: \"PROTIME\", \"INR\"    HgBA1c:    No results found for: \"HGBA1C\"    BMP:  Lab Results   Component Value Date    CREATININE 0.81 03/04/2024       CBC:  No results found for: \"WBC\", \"RBC\", \"HGB\", \"HCT\", \"MCV\", \"MCH\", \"MCHC\", \"RDW\", \"PLT\", \"MPV\"    Cardiac Enzymes:    No results found for: \"TROPHS\"    Hepatic Function Panel:    No results found for: \"ALKPHOS\", \"ALT\", \"AST\", \"PROT\", \"BILITOT\", \"BILIDIR\"        Edinson Paige, DO        "

## 2024-06-21 ENCOUNTER — TELEPHONE (OUTPATIENT)
Dept: CARDIOLOGY | Facility: CLINIC | Age: 79
End: 2024-06-21
Payer: MEDICARE

## 2024-06-21 NOTE — TELEPHONE ENCOUNTER
Pt called and left message stating she took a EKG test on her mobile machine and it came back good

## 2024-06-21 NOTE — TELEPHONE ENCOUNTER
"Patient called and LM stating her HR went up to 117 when she was sleeping but did not wake her up this time. She did her own EKG via her KardiaMobile device. EKG came back unclassified. I called the patient to obtain more information. She denies chest pain or SOB. She stated she did not feel the high HR. She stated she has done the EKG many times and it has resulted normal in the past. I advised the patient to repeat the EKG. Patient is not home now and can repeat when she gets home. She stated she also has a Pdf copy of unclassified EKG if provider would like to review it.     Per last office visit with Dr. Edinson Paige, DO on 6/13/24:    \"KARDIA monitor is the device Dr. Paige would like you to obtain to monitor your heart rhythm.   TAKE an extra 25mg atenolol as needed for break through palpitations.   REFER to Dr. Saundra Cordero with Electrophysiology.   Keep follow up appointment with Dr. Paige in April\"    Appointment with Dr. Saundra Cordero MD, FACC, FACP, FHRS is scheduled for 7/2/24    Routed to Fidelia Mosley LPN    "

## 2024-07-02 ENCOUNTER — APPOINTMENT (OUTPATIENT)
Dept: CARDIOLOGY | Facility: CLINIC | Age: 79
End: 2024-07-02
Payer: MEDICARE

## 2024-07-02 VITALS
SYSTOLIC BLOOD PRESSURE: 144 MMHG | HEIGHT: 65 IN | WEIGHT: 127 LBS | DIASTOLIC BLOOD PRESSURE: 72 MMHG | HEART RATE: 57 BPM | BODY MASS INDEX: 21.16 KG/M2

## 2024-07-02 DIAGNOSIS — Z71.89 ENCOUNTER FOR MEDICATION REVIEW AND COUNSELING: ICD-10-CM

## 2024-07-02 DIAGNOSIS — R00.2 PALPITATIONS: Primary | ICD-10-CM

## 2024-07-02 DIAGNOSIS — Z76.89 ENCOUNTER TO ESTABLISH CARE WITH NEW DOCTOR: ICD-10-CM

## 2024-07-02 DIAGNOSIS — R55 NEAR SYNCOPE: ICD-10-CM

## 2024-07-02 DIAGNOSIS — R06.02 SHORTNESS OF BREATH: ICD-10-CM

## 2024-07-02 DIAGNOSIS — Z71.89 ENCOUNTER TO DISCUSS TREATMENT OPTIONS: ICD-10-CM

## 2024-07-02 DIAGNOSIS — I48.91 ATRIAL FIBRILLATION, UNSPECIFIED TYPE (MULTI): ICD-10-CM

## 2024-07-02 DIAGNOSIS — Z78.9 NEVER SMOKED TOBACCO: ICD-10-CM

## 2024-07-02 DIAGNOSIS — R42 DIZZINESS: ICD-10-CM

## 2024-07-02 DIAGNOSIS — I48.92 ATRIAL FLUTTER, UNSPECIFIED TYPE (MULTI): ICD-10-CM

## 2024-07-02 DIAGNOSIS — R07.9 CHEST PAIN, UNSPECIFIED TYPE: ICD-10-CM

## 2024-07-02 DIAGNOSIS — I47.19 AVNRT (AV NODAL RE-ENTRY TACHYCARDIA) (CMS-HCC): ICD-10-CM

## 2024-07-02 PROCEDURE — 93000 ELECTROCARDIOGRAM COMPLETE: CPT | Performed by: INTERNAL MEDICINE

## 2024-07-02 PROCEDURE — 1036F TOBACCO NON-USER: CPT | Performed by: INTERNAL MEDICINE

## 2024-07-02 PROCEDURE — 99215 OFFICE O/P EST HI 40 MIN: CPT | Performed by: INTERNAL MEDICINE

## 2024-07-02 PROCEDURE — 1159F MED LIST DOCD IN RCRD: CPT | Performed by: INTERNAL MEDICINE

## 2024-07-02 RX ORDER — MUPIROCIN 20 MG/G
1 OINTMENT TOPICAL ONCE
OUTPATIENT
Start: 2024-07-02 | End: 2024-07-02

## 2024-07-02 RX ORDER — LANOLIN ALCOHOL/MO/W.PET/CERES
400 CREAM (GRAM) TOPICAL 2 TIMES DAILY
Qty: 180 TABLET | Refills: 3 | Status: SHIPPED | OUTPATIENT
Start: 2024-07-02

## 2024-07-02 RX ORDER — CHLORHEXIDINE GLUCONATE 40 MG/ML
SOLUTION TOPICAL ONCE
OUTPATIENT
Start: 2024-07-02 | End: 2024-07-02

## 2024-07-02 RX ORDER — ACETAMINOPHEN 500 MG
500 TABLET ORAL EVERY 6 HOURS PRN
COMMUNITY

## 2024-07-02 RX ORDER — SODIUM CHLORIDE 9 MG/ML
100 INJECTION, SOLUTION INTRAVENOUS CONTINUOUS
OUTPATIENT
Start: 2024-07-02

## 2024-07-02 ASSESSMENT — ENCOUNTER SYMPTOMS
DIZZINESS: 1
PALPITATIONS: 1
NEAR-SYNCOPE: 1
DYSPNEA ON EXERTION: 1
TREMORS: 1

## 2024-07-02 NOTE — PROGRESS NOTES
Referred by Dr. Paige for palpitation      History Of Present Illness:    Corazon Regalado is a 79 y.o. female presenting with [facility] care.  She is accompanied by her .    She has dyspnea, near-syncope, and chest discomfort.  Patient has palpitation up to 141 beats per minute and up to 16 minutes per her Fitbit.  48 hour monitor was negative. No symptoms occurred during monitoring period.  Home Kardia monitor has been negative.    She is here for arrhythmia evaluation for possible loop recorder.    Past Medical History:  See List    Past Surgical History:  See List      Social History:  She reports that she has never smoked. She has never used smokeless tobacco. She reports current alcohol use. She reports that she does not use drugs.    Family History:  Family History   Problem Relation Name Age of Onset    Hypertension Mother Celia Ocampo     Other (angina pectoris) Mother Celia Ocampo     Angina Mother Celia Ocampo     Stroke Father Ty Ocampo     Other (CABG) Father Ty Ocampo     Other (heart problem) Father Ty Ocampo     Other (PTCA) Father Ty Ocampo     Diabetes type I Father's Sister Sharmila         Allergies:  Ciprofloxacin and Propranolol    Outpatient Medications:  Current Outpatient Medications   Medication Instructions    acetaminophen (TYLENOL) 500 mg, oral, Every 6 hours PRN    atenolol (Tenormin) 25 mg tablet TAKE 1 TABLET in the morning and 1/2 tablet in the evening. Take one additional tablet of atenolol daily for breakthrough palpitations    BIOTIN ORAL 5,000 mcg, oral, Daily    CALCIUM CITRATE-VITAMIN D3 ORAL oral, TAKE PO AS DIRECTED PER PACKAGE INSTRUCTIONS.    cholecalciferol (VITAMIN D-3) 25 mcg, oral, Daily    L. acidophilus/Bifid. animalis 32 billion cell capsule 1 capsule, oral, Daily    lactase (LACTAID) 3,000 Units, oral, 3 times daily (morning, midday, late afternoon), prn    magnesium oxide (MAG-OX) 400 mg, oral, Daily     "multivitamin tablet 1 tablet, oral, Daily    mv-min-FA-vit K-lutein-zeaxant (PreserVision AREDS 2 Plus MV) 200 mcg-15 mcg- 5 mg-1 mg capsule 1 capsule, oral, 2 times daily    polyethylene glycol (MIRALAX) 17 g, oral, Daily RT, prn    potassium citrate CR (Urocit-K-10) 10 mEq ER tablet 10 mEq, oral, Daily    primidone (MYSOLINE) 50 mg, oral,  Take 2 tablets at lunch and 4 tabs at HS    pseudoephed/acetaminoph/diphen (MEDI-TABS ALLERGY SINUS ORAL) 1 tablet, oral, Daily, As needed    SIMETHICONE ORAL 125 mg, oral, Daily, As needed    vitamin k 100 mcg, oral, Daily        Last Recorded Vitals:  Vitals:    07/02/24 0742   BP: 144/72   BP Location: Left arm   Patient Position: Sitting   Pulse: 57   Weight: 57.6 kg (127 lb)   Height: 1.638 m (5' 4.5\")     Review of Systems   Cardiovascular:  Positive for chest pain, dyspnea on exertion, near-syncope and palpitations.   Neurological:  Positive for dizziness and tremors.   All other systems reviewed and are negative.      Physical Exam:  Constitutional:       General: Awake.      Appearance: Normal and healthy appearance. Well-developed and not in distress.   Neck:      Vascular: No JVR. JVD normal.   Pulmonary:      Effort: Pulmonary effort is normal.      Breath sounds: Normal breath sounds. No wheezing. No rhonchi. No rales.   Chest:      Chest wall: Not tender to palpatation.   Cardiovascular:      PMI at left midclavicular line. Bradycardia present. Regular rhythm. Normal S1. Normal S2.       Murmurs: There is no murmur.      No gallop.  No click. No rub.   Pulses:     Intact distal pulses.   Edema:     Peripheral edema absent.   Abdominal:      Tenderness: There is no abdominal tenderness.   Musculoskeletal: Normal range of motion.         General: No tenderness. Skin:     General: Skin is warm and dry.   Neurological:      General: No focal deficit present.      Mental Status: Alert and oriented to person, place and time.              Last Labs:  CBC -  No results " "found for: \"WBC\", \"HGB\", \"HCT\", \"MCV\", \"PLT\"    CMP -  No results found for: \"CALCIUM\", \"PHOS\", \"PROT\", \"ALBUMIN\", \"AST\", \"ALT\", \"ALKPHOS\", \"BILITOT\"    LIPID PANEL -   No results found for: \"CHOL\", \"TRIG\", \"HDL\", \"CHHDL\", \"LDLF\", \"VLDL\", \"NHDL\"    RENAL FUNCTION PANEL -   Lab Results   Component Value Date    CREATININE 0.81 03/04/2024        No results found for: \"BNP\", \"HGBA1C\"    Last Cardiology Tests:  ECG:  Today.  Sinus bradycardia.  Normal axis.  Corrected QT interval 440 ms.  Right bundle branch block    Echo:  Transthoracic Echo (TTE) Complete 03/15/2024  LVEF 55 to 60%.  Mild AI.  No TR.  Trace MR      CT angio coronary art with heartflow if score >30% 03/08/2024        Lab review: I have personally reviewed the laboratory result(s) see above    Assessment/Plan   Diagnoses and all orders for this visit:  Palpitations      Dizziness       Near syncope       Margo Dominguez RN      Palpitation with no recently documented arrhythmia.  Remote history of AVNRT status post ablation 2004 and atrial flutter status post ablation 2005. Normal LVEF. Mild valvular heart disease. Mild CAD by CT. Shared decision making performed.,  [place] decision tool.  All questions answered.  Model of loop recorder reviewed with patient and .  Given infrequent symptoms and negative monitor, recommend loop recorder.  Long consent obtained.  Exertional dyspnea on exertion.  Pulmonary evaluation and progress.  Other medical issues of essential tremor, GERD, artery stenosis, peptic ulcer disease, nephrolithiasis.    Counseling greater than 50% of visit was performed.  The patient, , and I discussed normal conduction, bradycardia, tachycardia, Fitbit findings, cardiac monitor strip, Holter monitor results, echocardiogram results, CT results, shared decision making, vagal maneuvers, when to go to the emergency room, evaluation, medications, when to take additional atenolol and magnesium, treatment options, risk, benefits, " imponderables, and preoperative cardiac evaluation.  Informed consent obtained.  Modifications also reviewed.  All questions answered.  Patient and family appreciative care.

## 2024-07-02 NOTE — PATIENT INSTRUCTIONS
Continue same medications/treatment.  Patient educated on proper medication use.  Patient educated on risk factor modification.  Please bring any lab results from other providers/physicians to your next appointment.    Please bring all medicines, vitamins, and herbal supplements with you when you come to the office.    Prescriptions will not be filled unless you are compliant with your follow up appointments or have a follow up appointment scheduled as per instruction of your physician. Refills should be requested at the time of your visit.    INCREASE magnesium oxide to 400mg twice daily  SCHEDULE loop recorder insertion. Obtain labs 1 week prior to procedure. Orders are in the system.  HOLD preservision 1 week prior to procedure.   Follow up 7 days after procedure for wound check.    EMERALD SUAREZ RN, AM SCRIBING FOR AND IN THE PRESENCE OF DR. BRANDON BLACK MD, FACC, FACP, RS

## 2024-07-08 ENCOUNTER — PATIENT MESSAGE (OUTPATIENT)
Dept: CARDIOLOGY | Facility: CLINIC | Age: 79
End: 2024-07-08
Payer: MEDICARE

## 2024-07-30 NOTE — H&P (VIEW-ONLY)
"  Osteoporosis and Metabolic Bone Disease <redacted file path>       FOLLOW UP VISIT    Referring Provider: Meme Kemp  Date of Service: 7/30/2024    Gender: female  Ethnicity: White  Age: 79 year old    Chief Complaint: Follow Up  Last Rheumatology visit: 3/19/2024 (with Meme Kemp)   Date of Last Reclast Injection: 3/1/2023     Corazon Regalado is a 79 year old White female   who presents on 7/30/2024 for in person visit for follow up of Osteoporosis.       INTERVAL HISTORY    July 30, 2024   Accompanied by spouse: Luis Carlos    Patient reports:   No interim fractures  No new bone pains  No dental or Jaw problems or pains.  Denies jaw pains  Follows with dentist every 6 months  Denies upcoming dental work or invasive procedures    No serious infections or fevers    Calcium dietary  Vitamin D : is not sure of dose, also takes citracal with D    Exercise: M/W/F 1 hr [employer] sne[employer]'s program- aerob, wts, resistance band    OP therapy: Reclast 3/2023  Med well tolerated        Seen PCP team for rt hip pain, had x-rays today  Rt hip pain, when sits or stands, sometimes with walking  States when first gets up from sitting and walks across the room  When has to stand for some time notices more.  Pain located at the right lateral jt line  Denies trauma or injury  Denies new exercise program  Onset \"couple yrs\"  States gets better for some time and worse at others, off and on  More recently, feeling it more, getting out of passenger car  States pressure on rt side and pushing with rt LE to get up from floor after exercise, would be painful        Answers submitted by the patient for this visit:  Review of Systems Rheumatology (Submitted on 7/23/2024)  Fever                                            : No  Recent unintentional weight change: No  Eye pain: No  Eye redness: No  Vision Disturbance: No  Eye Dryness: Yes  Nosebleeds: No  Sores in your mouth: No  Trouble Swallowing: No  Dry Mouth: No  Chest pain: Yes- following " with Cardiology, states had atrial tachy and will be having additional studies  Leg Swelling: No  A cough: No  Shortness of breath: Yes- states seen Pulmonary, they suspect is cardiac  Pain with breathing: No  Heartburn: Yes  Abdominal pain: No  Diarrhea: No  Black tarry stools: No  Blood in urine: No  Pain or burning with urination: No  Joint pain or stiffness: Yes- rt hip as above  Muscle aches: Yes  Morning Stiffness in Joints: Yes  A rash: No  Skin Color Changes: No  Hair Loss: No  Nail Changes: Yes  Headaches: No  Numbness: No  Memory Loss: No  Swollen Glands: No           RAPID 3 Key Activities of Daily Living 7/23/2024 10:50 AM 3/14/2024  1:00 PM 8/9/2023 11:34 AM First answer obtained - 12/7/2022  7:40 PM   Dress self? Without ANY difficulty Without ANY difficulty Without ANY difficulty Without ANY difficulty   Get in and out of bed? Without ANY difficulty Without ANY difficulty Without ANY difficulty Without ANY difficulty   Walk outdoors? Without ANY difficulty Without ANY difficulty Without ANY difficulty Without ANY difficulty   Wash and dry body? Without ANY difficulty Without ANY difficulty Without ANY difficulty Without ANY difficulty   Get in and out of car? Without ANY difficulty Without ANY difficulty Without ANY difficulty Without ANY difficulty       Impression     Diagnoses:   (M85.851,  M85.852) Osteopenia of both hips  (primary encounter diagnosis)  (Z92.29) History of bisphosphonate therapy  (M25.551) Pain in right hip  (M11.251) Chondrocalcinosis of right hip  (M11.20) Pseudogout  (M16.11) Primary osteoarthritis of right hip  (M11.20) Calcium pyrophosphate deposition disease (CPPD)  (Z71.2) Encounter to discuss test results  (Z71.89) Encounter for medication review and counseling  (Z71.89) Counseling on health promotion and disease prevention     The patient has: osteopenia  No history of fractures  Most Recent BMD Date: 2/3/24   LS: 1.035 g/cm2  Hip - Right: 0.604 g/cm2  Hip - Left: 0.661  g/cm2  LS T-Score: -0.1 LS Z-Score: 2.4 increase   R Hip T-Score: -2.2 R Hip Z-Score: 0 stable   L Hip T-Score: -1.7 L Hip Z-Score: 0.5 stable   BMD Comment: Spine is unreliable and considered uninterpretable per ISCD guidelines     The patient has low bone density in the range of Osteopenia, according to WHO classification with high risk of hip fracture.   Prior history of fragility fracture: none  DXA 10/6/2021, lowest T-score is -2.2 (rt hip).    Additional risk factors include: Postmenopausal female, age, wt <127 lbs, family history of osteoporosis (mother), med risk on mysoline and PPI (which is risk for hip fracture), kidney stones, h/o hypercalciuria (under the care of nephrology/urology).  She reports is gluten intolerant, tested negative for celiac disease.  Her iPTH have not been elevated, no hypercalcemia.  Has had one reading of alk phos <40, but most have been >40 and no other history of hypophosphtasia  Elevated NTX  She has had ht loss of 2 inches most likely sec to scolioses and degen disease. I reviewed her spine imaging in 2021 and 2022, no findings of vert. compression fractures. Will defer from further radiation exposure with spine x-rays since spine imaging completed.  No vert fxs on T-sp 3/2022 and CT flank 8/2022    Her FRAX is increased for hip fracture risk, and this is not including mysoline and PPI risk. She requires PPI due to her history of ulcer and gastrointestinal bleed.  Agree with Dr. Llamas, patient is not candidate for oral bisphosphonate. Treatment will need to be IV bisphosphonate, further discussion listed below.    Advised on indication for completing metabolic bone testing and evaluation.  I reviewed risk for future fragility fractures and bone loss.  Reviewed indication and guideline recommendations for pharmacologic therapy  Reviewed healthy lifestyle and role of diet and exercise and stress on Osteoporosis and bone loss.  I have discussed FDA approved medications  Written  information provided to patient on OP, Ca/D, BMD, ONJ, Bone health and recommendations, OP medications.  Also provided information on web for additional information if necessary, CC, NOF and ISCD and [facility].  ---------------------------      Osteopenia, as discussed above  -DXA 2/3/2024, lowest T-score -2.2 (rt fem neck)   osteopenia -LOWEST T-SCORE IS -2.2  IN THE RIGHT HIP   Improved BMD at spine stable at hips (spine is unreliable due to degen disease)  Received Reclast infusion on 3/2023  No interim fractures  Serum CTX was in low range, indicating good response to Reclast     At this time, CTX low range, no indication for Reclast infusion this year, will continue to follow  Will continue to follow CTX and DXA overtime    Patient is following with PCP team for rt hip pain. She has x-rays, no fractures, no findings for inflammatory arthropathy.  I personally reviewed the films with the patient and noted chondrocalcinosis, as well as osteoarthroses.  Rt hip pain, possibly a combination of osteoarthroses, tendinopathy and CPPD  CPPD assoc disease w/u negative  Reviewed conservative care  Offered trial of colchicine and offered PT       Osteoporosis FRAX Risk Factors    No Fractures  Family History of osteoporosis mother   No parent with a hip fracture  Not a current smoker  Glucocorticoid use (Comment: 1 medrol dose pack) Previous use   No rheumatoid arthritis  Secondary osteoporosis (Comment: NONE)  No type 1 diabetes (IDDM)  no OI or osteodystrophy  No hyperthyroidism  No early menopause  No chronic malnutrition  no malabsorption  No chronic liver disease  No alcohol use more than 3 units per day       FRAX (WHO 10 Year Fracture Risk)    Does not apply: current/previous Rx         Plan     PLAN/RECOMMENDATION:  Reviewed indication for orders with instructions  Reviewed timing of labs    DISCUSSED colchicine for CPPD  Discussed dosing  Risks, benefits, alternatives, reported side effects, indication,  limitations/expectation of medication(s) were discussed with patient and patient wished to proceed.    Offered PT, if no benefit, will refer to Ortho/Sports Med  Will await radiology full report      -OP Medication: Reclast 5mg IV last dose 3/2023  As discussed, above following CTX and DXA  At this time, CTX low range, no indication for Reclast infusion this year, will continue to follow  Will continue to follow CTX and DXA overtime    As previously discussed:  Reclast was indicated and recommended  FRAX high for hip fracture, bisphosphonate treatment with zoledronic acid is recommended  Reclast infusion, has been shown to successfully decrease risk of hip fracture, based on multiple studies, including Dec 2018 NEJM issue by Brandon resendez al, in patients with osteopenia with high frax scores for hip fractures. Fracture Prevention with Zoledronate in Older Women with Osteopenia. Brandon resendez al, NEJM 12/20/2018    She has normal renal function, will update creat, calcium and D.  Patient reports UTD on her dental work and denies upcoming dental procedures.  Reviewed adequate hydration, discussed reported flu like symptoms risk, and appropriate mgt, and safe dosing of acetaminophen if needed.    -Oral bisphosphonates are contraindicated- due to history of gerd and her digestive disease (SIBO), 3 bleeding ulcers (due to nsaid use)  -Anabolic agents, such as teriparatide of abaloparatide are contraindicated due to her history of kidney stones and hypercalciuria.    Reviewed with patient Osteoporosis/Osteopenia diseases and treatment.  Review of Osteoporosis medications   Medications that prevent bone loss and Osteoporosis fractures:  -Bisphosphonates are a group of medications that are available as pills (oral) or injected/infused in the vein as intravenous (IV)  -Oral bisphosphonates (such as Actonel/risedronate, Boniva/ibandronate, Fosamax/alendronate), these are taken either once a week or once a month (depending on med chosen),  first thing in the morning on an empty stomach with a glass of pure water and would need to stay upright and avoid food or other beverages for 30 to 45 minutes, with special instructions to follow. This is to allow the medication to be properly absorbed. The duration of treatment would be limited to 5 yrs, when possible, to prevent increased risk for atypical fracture of femur.  Patients who are unable to swallow pills or have trouble swallowing or have a gastrointestinal disease would not be candidates for these medications. An intravenous form of bisphosphonate can be considered per below.  -IV Reclast (zoledronic acid), is 5 mg intravenous infusion given once a year for Osteoporosis and once every two years for Osteopenia. This is a 15 to 20 minute infusion given at our infusion center.  For that infusion, it would be important to have completed any necessary dental work and continue following with your dentist every 6 months and the recommended lab work we ordered. You would need to be well hydrated to insure good kidney function. Please insure adequate hydration with water, about 6 to 8 cups of water, the day before, the day of the infusion and the day after. Please avoid dehydration in general.   Some people experience flu like symptoms and low grade fever, after the infusion. This is usually self limited and will resolve. The recommended treatment for symptom relief is taking acetaminophen/Tylenol two extra strength tablets (500mg each) every 6 hours until this has resolved, usually does not last more than 3 days.  IV Reclast once a year is usually limited to no more than 3 yrs, to prevent increased risk for atypical fracture of femur.  Bisphosphonates cannot be prescribed to patients who have very low kidney function and those with untreated dental problems.  Reviewed importance of regular dental care, follow up with dentist and good oral hygiene  We also reviewed risk of reported ONJ (osteonecrosis of the  jaw) and atypical fracture of femur.  -Subcutaneous Prolia: this is one injection every 6 months, given by the nurse in the office. This medication is given long term, indefinitely. Prolia should not be discontinued without proven back up therapy, due to incr risk of vert fractures after withdrawing Prolia.  Recent research findings recommend that Prolia if started and continued past 1 to 2 yrs, may need to be indefinitely, for now, until new studies show effective transition therapy. Advised that Prolia should not be discontinued due to reported increased risk of bone loss and vertebral fractures after withdrawing Prolia. Multiple studies have looked into transition therapy. Recent study from [facility] Chyna et al, 5/27/2020, reported one infusion of IV Reclast did not prevent bone loss after Prolia was discontinued. At this time, would need to await additional studies on what is the best approach and strategy, for if and when, Prolia is no longer necessary. For patients who have been on Prolia past 2 yrs, bone loss still occurred despite an infusion with zoledronic acid following discontinuation of Prolia. Some patients required another infusion of zoledronic acid after 6 months from their first one. In some patients Prolia would need to be continued indefinitely. The 10 year data on Prolia are reassuring in terms of safety and efficacy.   The current recommendations is that if Prolia is continued past 2 yrs, it is recommended to continue on it indefinitely, meaning lifelong.  Infection precautions are recommended.  Patients who experience frequent or serious UTI's (urinart tract infections), are not candidates for Prolia.  Indication for lab work prior to Prolia injections and importance of continued follow up.  Risks, benefits, alternatives, reported side effects, indication, limitations/expectation of medication(s) were discussed with patient.  Written information provided for patient review.  Reviewed importance  of regular dental care, follow up with dentist and good oral hygiene  We also reviewed risk of reported ONJ (osteonecrosis of the jaw) and atypical fracture of femur.    Medications that build bone density and prevent osteoporosis fractures:  -Subcutaneous Forteo or Tymlos, once daily injections at home: these are for 2 yrs and then will need to transition to anti-resorptive therapy, such as a bisphosphonate or Prolia after that, based on guidelines.  These medications are contraindication in certain conditions, such as hyperparathyroidism, hypercalcemia, Paget's disease of the bone, radiation therapy to the bones, etc...    Medication that both prevents bone loss and builds bone density and prevents Osteoporosis fractures:  -Subcutaneous Evenity (romosozumab, an anti-sclerostin monoclonal antibody). This is a monthly subcutaneous injection (2 injections every visit, by the nurse). This is given for 1 year and if further treatment is required after that, we would transition to Prolia or a bisphosphonate.  It has listed CV risk and warning that it should not be initiated in patients who have had an MI or stroke in the preceding year or those considered high risk. We may need a clearance from a Cardiologist prior to proceeding with this medication in patients who have a cardiovascular disease history.  This is only prescribed for 1 year and if treatment for Osteoporosis is still warranted, would need to switch to anti-resorptive therapy, according to recommendations.    Other less potent Osteoporosis medications, such as evista and miacalcin have not been proven to prevent non-vertebral fractures     It is important to continue with regular dental care, follow up with dentist and good oral hygiene  Osteoporosis medications may increase the risk of ONJ osteonecrosis of the jaw bone) and atypical fracture of femur (this is discussed above). This applies to all except Forteo and Tymlos.   To minimize risk of ONJ, it is  important to follow up with your dentist every 6 months.  Risks, benefits, alternatives, reported side effects, indication, limitations/expectation of medication(s) were discussed with patient.  Written information provided for patient review.    - Written resources and additional information on Osteoporosis and the different available medications were provided an link to the American College of Rheumatology website at: [email or web address]-Am-[email or web address]    - Additional information on Bone Health and Healthy Lifestyle were also provided.       Physical Therapy/ Osteoporosis Program: in reserve    Bone Health Recommendations:  - Bone Density test; next due in 2 yrs from last, on the same machine to allow for comparison.  Bone Density is recommended after menopause and after age 55-60, sooner if patient has risk factors, sooner if on systemic steroid use of 3 months or more.  -Vitamin D supplementation recommended, optimal dose is the dose necessary to achieve Vitamin D 25-OH blood level in range of 40-60 ng/mL.  (Vitamin D supplement in international units, is the dose necessary to achieve a Vitamin D 25-OH blood level in range of 40-60 ng/mL).  Also reviewed vitamin K2   -Recommended daily dose of calcium: 1200mg total a day in divided doses. Calcium from dietary sources, if not sufficient, or if with h/o calcium nephrolithiasis would recommend Calcium Citrate supplement, as it is recommended to avoid caclium carbonate products, which as main dietary calcium source.  The after visit summary has information on dietary calcium and instructions on reading calcium label and converting the %DV to mg.  -Regular weight-bearing and muscle-strengthening exercise  -Avoidance of tobacco smoking, excessive alcohol intake and excessive caffeine intake.  -Fall and fracture precautions  -Continued regular dental follow up visits and good dental/gum care    ---      Reviewed  importance of regular dental care, follow up with dentist and good oral hygiene  We also reviewed risk of ONJ and atypical fracture of femur  Risks, benefits, alternatives, reported side effects, indication, limitations/expectation of medication(s) were discussed with patient.  Written information provided for patient review.  -Discussed medication dosage, usage, goals of therapy, and side effects.    The nature of osteopenia and osteoporosis and bone thinning, as well as bone loss, was discussed with the patient as well as risk factors for osteoporosis and bone loss, as well as risk factors for fragility fractures from osteoporosis and reported associated risk of morbidity and mortality.  I also informed patient that osteoporosis is a silent disease, it is asymptomatic and does not lead to pains. Patients with new bone pains require evaluation for fractures. Patients with chronic pains should not be assumed that is from osteoporosis.   I also reviewed with patient risk of fragility fracture from osteoporosis and indication and recommendations by the National Osteoporosis Foundation for pharmacologic therapy and standard of care to decrease risk of OP fractures and bone loss. I reviewed indication for therapy with FDA approved osteoporosis medications with the patient and based on current guidelines and recommendations.  I reviewed risk of atypical fracture of femur and ONJ with anti-resorptive therapy.  With reports and concern regarding atypical and subtroch. fracture of femur with long term use of bisphosphonates/alendronate and anti-resorptive agents, there have been recommendations for consideration for drug holiday (for 1 year or more, this has not been outlined clearly either) after completing 3 yrs (on IV Reclast) and 5 yrs (on oral bisphosphonate).  We also follow bone markers and monitor for evidence of oversuppression with anti-resorptive agents.   I offered additional time to address all patient's  questions and answer all OP questions.     I reviewed and provided written information on healthy lifestyle, healthy food and bone healthy diet (with emphasis on whole plant based diet), avoiding refined carbs/sugars and processed food, appropriate exercise (stretching, cardio and strengthening), good sleep hygiene, stress mgt, and supplementing vital deficiencies and maintaining healthy wt and BMI. Additional information provided with references and educational information.      Available metabolic bone laboratories were reviewed with the patient.  DXA Test reviewed at todays visit.  I personally reviewed the patient's DXA scans  Additional time spent on interpretation of test results.   Available laboratories an their clinical significance were reviewed with the patient.  Radiographs were reviewed at todays visit.  Additional time was spent outside of the patient visit to review records. today.   Assessment and plan were discussed with the patient.  Additional time spent with the patient to discuss their questions.  Additional time spent with the patient devoted to discussing treatment strategy, planning, implementation and preventive health and wellness recommendations.    -Will follow results and advise further by MyChart/Telephone or Apt.      Dear Dr. Llamas and Dr. Lozoya:  I had the pleasure of seeing your patient, Mrs. Regalado.  I have enclosed a copy of my clinic note with my assessment and recommendations for this patient.   **-Certain anti-epilepsy medications such as Mysoline (primidone) can lead to bone loss and Osteoporosis. If altnerative therapy is acceptable consider change in therapy.  Patient is on therapy for tremors.  -Patient is following with Primary care physician and other providers for their other health care.  -Continuous f/u with PCP for cardiovascular disease prevention, for age appropriate cancer screening and routine health maintenance and infection precautions and age appropriate  "immunization, recommended.   Thank you for allowing me to participate in the care of your patient.             Return for April 2025 for OP., sooner if needed  will follow results    I spent a total of 38 minutes on the date of the service which included preparing to see the patient, face-to-face patient care, completing clinical documentation, obtaining and/or reviewing separately obtained history, performing a medically appropriate examination, counseling and educating the patient/family/caregiver, ordering medications, tests, or procedures, communicating with other HCPs (not separately reported), independently interpreting results (not separately reported), communicating results to the patient/family/caregiver, and care coordination (not separately reported).    Charlene Reddy MD    cc: PCP: Martha Llamas MD  [address] [place] [address]  Phone: [phone]  Fax: [phone]     ===============================================================    Subjective    Disease History      HISTORY OF PRESENT ILLNESS    NEW CONSULT December 14, 2022    Mrs. Regalado is a very nice 77 y.o. lady here for evaluation of low bone density and therapy.    Accompanied by Luis Carlos, spouse    Per Dr. Llamas, referring physician: \"Rheum opinioin for treatment options, prior h/o gastric ulcer GIB (likely not a good oral bisphosphonate candidate).\"    Doing well today  S/p surgery for kidney stone 12/9/2022, percut. nephrolithotomy   Reports long standing history of kidney stones, oxalate    Denies history of fractures  She denies being on OP medications     Answers submitted by the patient for this visit:  Review of Systems Rheumatology (Submitted on 12/7/2022)  Fever                                            : No  Recent Unintentional Weight Change: No  Eye Pain: No  Eye Redness: No  Vision Disturbance: No  Eye Dryness: No  Nose Bleeds: No  Sores in your Mouth: No  Trouble Swallowing: No  Dry Mouth: No  Chest Pain: No  Leg " "Swelling: No  A Cough: No  Shortness of Breath: No  Pain with Breathing: No  Heartburn: Yes  Abdominal Pain: No  Diarrhea: No  Black Tarry Stools: No  Blood in Urine: No  Pain or Burning with Urination: No  Joint Pain or Stiffness: No  Muscle Weakness: No  Muscle Aches: Yes- LB, not new, off and on  Joint Swelling: No  Morning Stiffness in Joints: Yes  A Rash: No  Skin Color Changes: No  Hair Loss: No  Nail Changes: No  Headaches: No  Numbness: No  Memory Loss: No  Swollen Glands: No           INTERVAL HISTORY    July 30, 2024   Accompanied by spouse: Luis Carlos    Patient reports:   No interim fractures  No new bone pains  No dental or Jaw problems or pains.  Denies jaw pains  Follows with dentist every 6 months  Denies upcoming dental work or invasive procedures    No serious infections or fevers    Calcium dietary  Vitamin D : is not sure of dose, also takes citracal with D    Exercise: M/W/F 1 hr Silver sneaker's program- aerob, wts, resistance band    OP therapy: Reclast 3/2023  Med well tolerated        Seen PCP team for rt hip pain, had x-rays today  Rt hip pain, when sits or stands, sometimes with walking  States when first gets up from sitting and walks across the room  When has to stand for some time notices more.  Pain located at the right lateral jt line  Denies trauma or injury  Denies new exercise program  Onset \"couple yrs\"  States gets better for some time and worse at others, off and on  More recently, feeling it more, getting out of passenger car  States pressure on rt side and pushing with rt LE to get up from floor after exercise, would be painful        Answers submitted by the patient for this visit:  Review of Systems Rheumatology (Submitted on 7/23/2024)  Fever                                            : No  Recent unintentional weight change: No  Eye pain: No  Eye redness: No  Vision Disturbance: No  Eye Dryness: Yes  Nosebleeds: No  Sores in your mouth: No  Trouble Swallowing: No  Dry Mouth: " No  Chest pain: Yes- following with Cardiology, states had atrial tachy and will be having additional studies  Leg Swelling: No  A cough: No  Shortness of breath: Yes- states seen Pulmonary, they suspect is cardiac  Pain with breathing: No  Heartburn: Yes  Abdominal pain: No  Diarrhea: No  Black tarry stools: No  Blood in urine: No  Pain or burning with urination: No  Joint pain or stiffness: Yes- rt hip as above  Muscle aches: Yes  Morning Stiffness in Joints: Yes  A rash: No  Skin Color Changes: No  Hair Loss: No  Nail Changes: Yes  Headaches: No  Numbness: No  Memory Loss: No  Swollen Glands: No           Osteoporosis History    No history of fractures  Most Recent BMD Date: 2/3/24   LS: 1.035 g/cm2  Hip - Right: 0.604 g/cm2  Hip - Left: 0.661 g/cm2  LS T-Score: -0.1 LS Z-Score: 2.4 increase   R Hip T-Score: -2.2 R Hip Z-Score: 0 stable   L Hip T-Score: -1.7 L Hip Z-Score: 0.5 stable   BMD Comment: Spine is unreliable and considered uninterpretable per ISCD guidelines  FRAX 10-year fracture risk: does not apply: current/previous Rx  Daily Calcium diet:  (Comment: reviewed)  Daily Calcium supplementation: 1300 mg (Comment: 650 mg twice daily ca citrate)  Daily Vitamin D: 4000 IU (Comment: 1000 from D, 2000 from ca citrate and ?1000 MVI)  Current Multivitamin: Yes  Dental: She follows with dentist every 6 months  Denies dental problems   Has had 2 teeth pulled in lifetime  Denies current or upcoming dental problems     Link to FRAX Website    Osteoporosis FRAX Risk Factors    No Fractures  Family History of osteoporosis mother   No parent with a hip fracture  Not a current smoker  Glucocorticoid use (Comment: 1 medrol dose pack) Previous use   No rheumatoid arthritis  Secondary osteoporosis (Comment: NONE)  No type 1 diabetes (IDDM)  no OI or osteodystrophy  No hyperthyroidism  No early menopause  No chronic malnutrition  no malabsorption  No chronic liver disease  No alcohol use more than 3 units per day        Osteoporosis Medication Risk Factors    No use of Anti-convulsants  No use of Aromatase inhibitors  No use of Furosemide  Proton pump inhibitor  No use of Thyroid hormone with oversuppression  No use of Long term heparin use  No use of other high risk medication for bone loss       Osteoporosis Disease-Specific Risk Factors      Weight < 127 lbs 122lbs     Height loss 2 inches    normal balance  Fall history   Fall Date Description / Comment    2020 no fx      No history of eating disorders  No hyperparathyroidism  Hypercalciuria (Comment: for yrs, most recent 7/2022 was not incresed)  Renal calculi  No CKD  Caffeine intake: none  Exercise routine: moderate regular exercise program (Comment: 3 days a wk for Silver sneaker, resistance bands at home)  Types of exercise: stretching, strength training, uses weights, physical therapy       no bone pain  osteoarthritis  hearing loss       Tobacco Use       Never smoked or used smokeless tobacco.          Vaping Use       Never used           Alcohol Use       Not Currently.          Musculoskeletal History       Fall Date Description / Comment    2020 no fx                Patient-Entered Data      PROMIS Assessments        1/29/2024 4/18/2024 7/16/2024   PROMIS Global Health - (T-Scores - the mean of general population = 50.  Five points is a clinically meaningful difference.)   Physical T-Score 44.9 44.9 47.7   Mental T-Score 56 56 53.3         4/18/2024 7/16/2024 7/23/2024   PROMIS CAT Pain Interference   PROMIS Pain Interference T-Score (range: 10 - 90)   53 (within normal limits)   PROMIS Pain Interference Percentile   38   PROMIS Adult Short Form-Global Health Score (Mental) 56 (Excellent) 53.3 (Very Good)          8/9/2023 3/14/2024 7/23/2024   PROMIS CAT Fatigue   PROMIS Fatigue T-Score 42 (within normal limits) 51 (within normal limits) 51 (within normal limits)   PROMIS Fatigue Percentile 79 46 46         1/30/2024 2/23/2024 7/23/2024   PROMIS PHYSICAL  FUNCTION T-SCORE   PROMIS Physical Function T-Score 48 (within normal limits) 50 (within normal limits) 42 (mild dysfunction)*   Physical Function Percentile 42 50 21       RAPID 3 Disease Activity  Weighed Score Levels:  0 - 1:  Near Remission  1.3 - 2.0: Low Severity  2.3 - 4.0: Moderate Severity  4.3 - 10.0: High Severity          8/9/2023 3/14/2024 7/23/2024   RAPID-3 Weighed Score   RAPID 3 Weighed Score Incomplete Incomplete Incomplete           7/23/2024   RAPID-3   Weighed Score Percentage Change Compared to Last Score -100          PHQ-9  0 - 4:  Minimal Depression  5 - 9:  Mild Depression  10 - 14: Moderate Depression  15 - 19: Moderately Severe Depression  20 - 27: Severe Depression        2/23/2024 4/18/2024 7/30/2024   PHQ-9   PHQ-2 Score 0 0 0    0   PHQ-9 Score  2          Objective    Treatment History      Osteoporosis - Antiresorptive Treatments       Treatment Start Date Stop Date Stop Reason Comment    actonel    unsure length treatment, took for >8 years and this was over 20 years ago          Osteoporosis - Anabolic Treatments       Treatment Start Date Stop Date Stop Reason Comment    None                Osteoporosis Risk Factors      Osteoporosis FRAX Risk Factors    No Fractures  Family History of osteoporosis mother   No parent with a hip fracture  Not a current smoker  Glucocorticoid use (Comment: 1 medrol dose pack) Previous use   No rheumatoid arthritis  Secondary osteoporosis (Comment: NONE)  No type 1 diabetes (IDDM)  no OI or osteodystrophy  No hyperthyroidism  No early menopause  No chronic malnutrition  no malabsorption  No chronic liver disease  No alcohol use more than 3 units per day       Osteoporosis Medication Risk Factors    No use of Anti-convulsants  No use of Aromatase inhibitors  No use of Furosemide  Proton pump inhibitor  No use of Thyroid hormone with oversuppression  No use of Long term heparin use  No use of other high risk medication for bone loss       Osteoporosis  Disease-Specific Risk Factors      Weight < 127 lbs 122lbs     Height loss 2 inches    normal balance  Fall history   Fall Date Description / Comment    2020 no fx      No history of eating disorders  No hyperparathyroidism  Hypercalciuria (Comment: for yrs, most recent 7/2022 was not incresed)  Renal calculi  No CKD  Caffeine intake: none  Exercise routine: moderate regular exercise program (Comment: 3 days a wk for Silver sneaker, resistance bands at home)  Types of exercise: stretching, strength training, uses weights, physical therapy         Bone Density Results      Last Bone Density       DXA-AXIAL SKELETON  Exam End: 2/3/2024 10:17 AM (Final result)   Narrative: * * *Final Report* * *    DATE OF EXAM: Feb  3 2024 10:17AM      [facility]  -   DXA - AXIAL SKELETON  / ACCESSION #  [id number]    PROCEDURE REASON: multiple diagnoses         * * * * Physician Interpretation * * * *     EXAMINATION: DXA BONE DENSITOMETRY BD DXA - AXIAL SKELETON    PATIENT DEMOGRAPHICS:  Age: 78 years, Gender: Female    SCANNER INFORMATION:    [id number]  Date Scanned:  2/3/2024 10:17 AM    CLINICAL HISTORY:  DIAGNOSTIC   Osteopenia of both hips Osteopenia of   both hips  .    RISK FACTORS FOR OSTEOPOROSIS AND ASSOCIATED FRACTURES REPORTED BY THIS   PATIENT:  Please refer to Bone Health Questionnaire in the EMR    CURRENT THERAPY:  Please refer to Bone Health Questionnaire in the EMR    TECHNICAL LIMITATIONS:  Degenerative disease of the spine    RESULTS:    Lumbar spine (L1, L2, L3, L4): 1.035 g/cm2, T-score -0.1, Z-score 2.4  Lumbar spine: 2021: 0.994 g/cm2  Statistically significant increase    Right Femoral Neck: 0.604 g/cm2, T-score -2.2, Z-score 0.0  Right Femoral Neck: 2021: 0.609 g/cm2  No statistically significant change    Right Total Hip: 0.769 g/cm2, T-score -1.4, Z-score 0.5  Right Total Hip: 2021: 0.755 g/cm2  No statistically significant change    Left Femoral Neck: 0.661  g/cm2, T-score -1.7, Z-score 0.5  Left Femoral Neck: 2021: 0.629 g/cm2  No statistically significant change    Left Total Hip: 0.784 g/cm2, T-score -1.3, Z-score 0.6  Left Total Hip: 2021: 0.778 g/cm2  No statistically significant change    CHANGE IS STATISTICALLY SIGNIFICANT IN THE SPINE OR HIP IF GREATER THAN   OR EQUAL TO 0.04 g/cm2      VERTEBRAL FRACTURE ASSESSMENT    Not performed.    TRABECULAR BONE ASSESSMENT    TBS not performed: not ordered    Impression: IMPRESSION:    THE LOWEST T-SCORE IS -2.2  IN THE RIGHT HIP    1) DIAGNOSIS (based on BMD alone):  OSTEOPENIA    Caution: Medical conditions other than osteoporosis may cause low bone   density, such as osteomalacia or renal osteodystrophy.   Clinical   correlation is necessary.     2) FRACTURE RISK (based on FRAX):                          10-year absolute fracture risk:                             - major osteoporotic fracture = 14.0 %                             - hip fracture = 4.2 %                        - A diagnosis of Osteoporosis,  a 10 year probability   of hip fracture greater                        than or equal to 3% or a 10 year probability of any   major osteoporosis-related                        fracture greater than or equal to 20% should be   considered for                        treatment.                        - DXA scanner generated FRAX calculations may   slightly differ from                        online FRAX calculations due to differences in   software versions.     - All recommendations and calculations are to be considered as     guidelines and should not replace sound clinical judgement    - Caution: Fracture risk may be increased independent of BMD in    patients with corticosteroid use, age greater than 65 years, or a    history of prior fragility fracture.      RECOMMENDATIONS:    Follow-up in 2 years or as clinically indicated.  Patients that are   taking corticosteroids, are transplant recipients or have    hyperparathyroidism should have annual follow-up.  Follow-up scans should   always be done on the same machine for accurate comparison.    FOR MORE INFORMATION ABOUT DIAGNOSIS AND TREATMENT:    [facility] for Osteoporosis and Metabolic Bone   Disease:? www.ccf.org/arthritis/osteo  National Osteoporosis Foundation:? www.nof.org  International Society of Clinical Densitometry www.iscd.org            : 198974  Transcribe Date/Time: Feb  3 2024 10:17A    Dictated by : NAVIN PHILIPPE MD    This examination was interpreted and the report reviewed and   electronically signed by:   NAVIN PHILIPPE MD on 2024  9:24AM  EST                     BONE DENSITY RESULTS: EXTERNAL           WOMEN / ESTROGEN  Age of Menarche: 13 years   Menstrual history: 12 menses/yr   Menopause status: post-menopausal   Type of Menopause: natural   Age of Menopause: 49 years   Previous estrogen use: HRT   HRT length of use: 5 to 10 yrs, is not sure   Hysterectomy: No      Ovaries: intact   Breast cancer: No   Family history of breast cancer: No          OB History          1    Para   1    Term                AB        Living   1         SAB        IAB        Ectopic        Multiple        Live Births               Obstetric Comments                     Relevant Previous Investigations          Latest Ref Rng & Units 2023 3/5/2024 4/15/2024 2024   Calcium   Calcium 8.5 - 10.2 mg/dL 9.6  9.5  9.6  9.3          Latest Ref Rng & Units 2022 2023 2023 4/15/2024   Alkaline Phosphatase   Alkaline Phosphatase 34 - 123 U/L 79  81  63  57    Alkaline Phosphatase 34 - 123 U/L 79  81  63  57          Latest Ref Rng & Units 2021 2023 2023 4/15/2024   TSH   TSH 0.270 - 4.200 mIU/L 1.690  1.390  0.896  0.867           Latest Ref Rng & Units 2023 2023 3/5/2024 2024   Vitamin D   Vitamin D 25 Hydroxy 31.0 - 80.0 ng/mL 46.8  44.0  45.8  56.2    Vit  D1,25 Dihydroxy 19.9 - 79.3 pg/mL 56.3             Latest Ref Rng & Units 2/1/2023   Osteocalcin   Osteocalcin 8.6 - 37.6 ng/mL 29.9          Latest Ref Rng & Units 8/9/2023 3/5/2024 4/15/2024 7/23/2024   Creatinine   Creatinine 0.58 - 0.96 mg/dL 0.77  0.80  0.77  0.81            Latest Ref Rng & Units 2/1/2023 5/25/2023 6/27/2023 4/15/2024   Protein, Total   PTH, Intact 15 - 65 pg/mL 44   40     Protein, Total 6.3 - 8.0 g/dL  7.2   6.8          Latest Ref Rng & Units 8/9/2023 3/5/2024 4/15/2024 7/23/2024   Albumin   Albumin 3.9 - 4.9 g/dL 4.5  4.5  4.2  4.4           Latest Ref Rng & Units 7/27/2017 7/7/2021 2/1/2023 6/27/2023   PTH   PTH, Intact 15 - 65 pg/mL 34  48  44  40          Latest Ref Rng & Units 6/11/2019 2/1/2023   Immunoglobulins   IgA 70 - 400 mg/dL 289  301          Latest Ref Rng & Units 2/1/2023   Procollagen   Procollagen Type 1 ug/L 68           Latest Ref Rng & Units 5/20/2015   Hepatitis Screen   Hep C Antibody IA Negative Negative      C Telopeptide, Beta C Telopeptide, Beta Cross Linked   Latest Ref Rng & Units 152 - 858 pg/mL   7/23/2024 127*    3/5/2024 130*        Imaging / Studies      Last XR Lumbar Spine - Impression Only       XR LUMBAR AP/LAT WEIGHT BEARING  Collected: 7/25/2013 11:32 AM (Final result)                  Last XR Thoracic Spine - Impression Only       XR THORACIC LIMITED 2V AP/LAT  Exam End: 3/21/2022  4:53 PM (Final result)   Impression: IMPRESSION:    S-shaped scoliosis of thoracolumbar spine otherwise unremarkable thoracic   spine series.          : AARON    Transcribe Date/Time: Mar 22 2022 10:38A...                 Last CT Lumbar Spine - Impression Only    No resulted procedures found.       Last CT Thoracic Spine - Impression Only    No resulted procedures found.       Last MRI Lumbar Spine - Impression Only    No resulted procedures found.       Last MRI Thoracic Spine - Impression Only    No resulted procedures found.         Review of Systems       Review of Systems  CONSTITUTION: Negative for: Fever and Recent weight change  HEENT: Negative for: Nosebleeds, Mouth sores, Trouble swallowing and Dry mouth  RESPIRATORY: Positive for: Shortness of breath Negative for: Cough and Pain with breathing  GASTROINTESTINAL: Positive for: Heartburn Negative for: Melena, Diarrhea and Abdominal pain  MUSCULOSKELETAL: Positive for: Arthralgias, Myalgias and Morning Joint Stiffness   NEUROLOGICAL: Negative for: Headaches, Numbness and Memory loss  SKIN: Positive for:  Nail changes Negative for:  Rash, Skin changes and Hair loss  EYES: Positive for: Eye dryness Negative for: Eye pain, Eye redness and visual disturbance  CARDIOVASCULAR: Positive for: Chest pain Negative for: Leg swelling  GENITOURINARY: Negative for: Dysuria and Hematuria  HEMATOLOGIC/LYMPHATIC: Negative for: Swollen glands  Jaw pain: No  All other reviewed and negative other than HPI.    Problem List     ACTIVE PROBLEM LIST  Essential Tremor  Atrial Tachycardia (Hcc)  Cervical Spondylosis Without Myelopathy  Subjective Tinnitus  Sensorineural Hearing Loss, Bilateral  Asymptomatic Postmenopausal Status  Cervical Dystonia  Bppv (Benign Paroxysmal Positional Vertigo)  Bilateral Presbyopia  Senile Nuclear Sclerosis  Pvd (Posterior Vitreous Detachment)  Dermatochalasis of Left Upper Eyelid  History of Atrial Flutter  Kobi (Obstructive Sleep Apnea)  Chronic Right Shoulder Pain  Symptomatic Varicose Veins of Both Lower Extremities  Other Constipation  Diverticular Disease  Macular Degeneration  Kidney Stones  Acute Right-Sided Thoracic Back Pain  History of GI Bleed  Osteopenia  Nonexudative Age-Related Macular Degeneration, Bilateral, Early Dry Stage  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 Paige/annual f/u, last seen 12/2016, EKG               with RBBB  No date: Bilateral presbyopia  No date: Colon polyp      Comment:  6/18 Cscope 1 polyp/mod tics, f/u 5 yrs.  No date: Diverticular disease      Comment:  Dr. Carlton, Cscope 6/24/13 f/u 5 yrs., neg  No date: Essential tremor      Comment:  Dr. Lozoya, cervical dystonia  No date: GIB (gastrointestinal bleeding)      Comment:  7/2021 GIB hospitalized, gastric ulcer  2010: Kidney stones      Comment:  Urology/[facility] and Renal/Dr. Hamilton, on preventive                medication, 2014 ESWL  No date: Macular degeneration      Comment:  ophtho  No date: Menopausal symptoms      Comment:  Reports no longer fu GYN Dr. Craig, DEXA 7/17 -1.6,               DEXA 6/15 Tscore -1.6, Dr. Craig- to have lap                fallopian tube removal  No date: Nonexudative age-related macular degeneration, bilateral,   early dry stage  No date: Nuclear senile cataract of both eyes  No date: Osteopenia of multiple sites      Comment:  to see Rheum, Tscore 10/2021 -2.2/elev FRAX hip  No date: Posterior vitreous detachment of both eyes  No date: Renal stones      Comment:  Dr. Hamilton/prev potassium citrate, To have lithotripsy                5/15, Dr. Ricci  x 3,  ESWL, 1/2014, (ca phos, ca                oxolate)  No date: Senile nuclear sclerosis, bilateral  No date: Sleep apnea      Comment:  mild  No date: Trigger finger      Comment:  R Dr. Mendoza Chapa/injxn 12/2018  2012: Vertigo      Comment:  BPPV      Past Surgical History     PAST SURGICAL HISTORY  2004, 2005: ABLATION ARRHYTHMOGENIC FOCI/PATHWAY W/O BYPASS      Comment:  x2  No date: BREAST BIOPSY  04/19/2010: COLONOSCOPY  2013: COLONOSCOPY  02/17/2014: CYSTOURETHROSCOPY      Comment:  Cystoscopy with stent removal by Dr CELINA Sethi  07/25/2014: CYSTOURETHROSCOPY      Comment:  Cystoscopy/Stent Removal;   Isaiah  06/29/2015: CYSTOURETHROSCOPY      Comment:  Cystoscopy stent removal: Dr. Colon  07/2021: EGD  09/15/2021: EGD  01/28/2014: ESWL  2010: LITHO KIDNEY, UNILAT  1989: LUMBAR ARTIF DISKECTOMY  2001: PAST SURGICAL HISTORY OF      Comment:  facelift   No date: PAST SURGICAL HISTORY OF      Comment:  tubal ligation  05/30/2014: PAST SURGICAL HISTORY OF; Left      Comment:  salpingectomy  10/12/2018: PAST SURGICAL HISTORY OF      Comment:  s/p left upper lid blepharoplasty on 10/12/18   09/22/2021: PAST SURGICAL HISTORY OF      Comment:  cystoscopy, left ureteral stent  12/20/2021: PAST SURGICAL HISTORY OF; Left      Comment:  left ESWL  No date: PAST SURGICAL HISTORY OF      Comment:  trigger finger  06/15/2023: REMV CATARACT EXTRACAP,INSERT LENS; Right      Comment:  Cataract Extraction with Toric  PC IOL and Hydrus stent                OD by Dr. Shea  06/22/2023: REMV CATARACT EXTRACAP,INSERT LENS; Left      Comment:  Cataract Extraction with Toric  PC IOL and Hydrus stent                OS by Dr. Shea; aimed -1.50  1989, 2011: W STRIPPER VEIN      Comment:  mini phobectomy 08/2021      Family History     FAMILY HISTORY    Problem Relation Age of Onset   • Stroke Father         dementia?   • Ischemic Heart Disease Father 60        stents, bypass   • No Ocular Disease Father    • other (Other) Father    • Alzheimer's Disease Mother 94   • Heart Mother         angina   • Hypertension Mother    • Thyroid Mother         tumor - benign   • Osteoporosis Mother         ankle fracture   • Glaucoma Mother    • Cataract Mother    • Arthritis Sister         neck   • Alzheimer's Disease Maternal Uncle    • Diabetes Paternal Aunt         type 1   • Diabetes Grandchild         type 1   • Anesthesia Problems No Family History        Social History     Social History     Tobacco Use   • Smoking status: Never   • Smokeless tobacco: Never   Vaping Use   • Vaping Use: Never used   Substance Use Topics   • Alcohol use: Not  Currently   • Drug use: Never       Medications     Present Osteoporosis Medications:   Current Calcium, Multivitamin, and Vitamin D Use on File       Minerals and Electrolytes - Calcium Replacement/Vitamin D Combinations Start End    * Calcium Citrate-Vitamin D3 (CITRACAL + D) 315-250 mg-unit Tab 1/15/2013 --    Sig - Route: Take 1 tablet by mouth twice daily with meals. - ORAL    Class: Med Update    Cosign for Ordering: Accepted by Manfred Smallwood on 1/21/2013  5:40 PM       Vitamins - D Derivatives Start End    * Cholecalciferol, Vitamin D3, 25 mcg (1,000 unit) cap 10/4/2013 --    Sig - Route: Take 1 capsule by mouth once daily. - ORAL    Class: Historical Med       Multivitamins Start End    * multivitamin tablet 1/15/2013 --    Sig - Route: Take 1 tablet by mouth once daily. - ORAL    Class: Med Update    Cosign for Ordering: Accepted by Manfred Smallwood on 1/21/2013  5:40 PM       Minerals and Electrolytes - Calcium Replacement/Vitamin D Combinations Start End    * Calcium Citrate-Vitamin D3 (CITRACAL + D) 315-250 mg-unit Tab 1/15/2013 --    Sig - Route: Take 1 tablet by mouth twice daily with meals. - ORAL    Class: Med Update    Cosign for Ordering: Accepted by Manfred Smallwood on 1/21/2013  5:40 PM          Current Outpatient Medications   Medication Sig   • ZEAXANTHIN, BULK, MISC Take 1 capsule by mouth two times a day.   • latanoprost (XALATAN) 0.005 % ophthalmic solution Use 1 Drop in both eyes daily at bedtime.   • primidone (MYSOLINE) 50 mg tablet Take 2 tablets by mouth two times a day AND 4 tablets daily at bedtime.   • potassium citrate ER (UROCIT-K 10) 10 mEq (1,080 mg) Take 1 tablet by mouth once daily.   • albuterol HFA (PROAIR HFA) 90 mcg/actuation inhaler 2 Puffs every 6 hours as needed for wheezing/shortness of breath. Take as directed   • tretinoin (RETIN-A) 0.025 % topical cream Apply small pea size amount to full dry face at bedtime (avoid eyes) and wash off in the morning   •  vitamin K2 100 mcg cap Take by mouth once daily.   • alpha-D-galactosidase (GAS ENZYME SUPPLEMENT ORAL) Take by mouth.   • LACTASE ENZYME ORAL Take by mouth.   • Simethicone 125 mg cap Take 1 capsule by mouth as needed.   • latanoprost (XALATAN) 0.005 % ophthalmic solution Use 1 Drop in both eyes daily at bedtime.   • acetaminophen (TYLENOL EXTRA STRENGTH) 500 mg tablet Take 2 tablets by mouth every 6 hours as needed for pain.   • polyethylene glycol 3350 (MIRALAX, GLYCOLAX) 17 gram packet Take 17 g by mouth once daily. Dissolve dose in 4 - 8 ounces of liquid and take as directed.   • COMPOUNDED PRESCRIPTION 1 capsule once daily. [address] Fortify probiotic age 50 plus  30 Billion live probiotics   • vit C/E/Zn/coppr/lutein/zeaxan (PRESERVISION AREDS 2 ORAL) Take 2 tablets by mouth once daily.      • cetirizine (ZYRTEC) 10 mg tablet Take 10 mg by mouth once daily.      • magnesium oxide (MAG-OX) 400 mg tablet Take 400 mg by mouth once daily. 2x per day, Dr. Cordero, reported by patient   • atenolol (TENORMIN) 25 mg tablet Take 1.5 tablets by mouth once daily.   • Cholecalciferol, Vitamin D3, 25 mcg (1,000 unit) cap Take 1 capsule by mouth once daily.   • biotin Tab Take 1 tablet by mouth once daily.   • multivitamin tablet Take 1 tablet by mouth once daily.   • Calcium Citrate-Vitamin D3 (CITRACAL + D) 315-250 mg-unit Tab Take 1 tablet by mouth twice daily with meals.   • colchicine 0.6 mg tablet Take 1 tablet by mouth once daily. as instructed     No current facility-administered medications for this visit.       Physical Exam   Physical Exam  /64   Pulse 66   Wt 57.2 kg (126 lb)   LMP 06/12/1993   BMI 21.29 kg/m²   afebrile  GEN APPEARANCE: Well nourished, healthy, and alert & oriented  HEENT/Oropharynx/Dental: clear on inspection, no jaw tenderness  No exposed bone  No conj, inj, no icterus  HEART: RRR, no g/r  LUNGS: Clear to auscultation.  ABD: soft, NT  SKIN: No apparent rash  MUSCULOSKELETAL:  No  clinical synovitis  No jt effusion  Reported discomfort with rt hip ROM/int/ext  No pain with jt ROM  -Dorsal kyphosis TS: No  Femur bone/Thigh/Spine prescussion tenderness: None  Gait: nl   Estimated TUG: normal      *Some images could not be shown.

## 2024-08-06 ENCOUNTER — LAB (OUTPATIENT)
Dept: LAB | Facility: LAB | Age: 79
End: 2024-08-06
Payer: MEDICARE

## 2024-08-06 DIAGNOSIS — R00.2 PALPITATIONS: ICD-10-CM

## 2024-08-06 DIAGNOSIS — R42 DIZZINESS: ICD-10-CM

## 2024-08-06 DIAGNOSIS — R55 NEAR SYNCOPE: ICD-10-CM

## 2024-08-06 LAB
ANION GAP SERPL CALC-SCNC: 9 MMOL/L (ref 10–20)
BUN SERPL-MCNC: 20 MG/DL (ref 6–23)
CALCIUM SERPL-MCNC: 9.3 MG/DL (ref 8.6–10.3)
CHLORIDE SERPL-SCNC: 103 MMOL/L (ref 98–107)
CO2 SERPL-SCNC: 34 MMOL/L (ref 21–32)
CREAT SERPL-MCNC: 0.79 MG/DL (ref 0.5–1.05)
EGFRCR SERPLBLD CKD-EPI 2021: 77 ML/MIN/1.73M*2
ERYTHROCYTE [DISTWIDTH] IN BLOOD BY AUTOMATED COUNT: 13.6 % (ref 11.5–14.5)
GLUCOSE SERPL-MCNC: 74 MG/DL (ref 74–99)
HCT VFR BLD AUTO: 38.5 % (ref 36–46)
HGB BLD-MCNC: 11.9 G/DL (ref 12–16)
INR PPP: 1 (ref 0.9–1.1)
MCH RBC QN AUTO: 29.8 PG (ref 26–34)
MCHC RBC AUTO-ENTMCNC: 30.9 G/DL (ref 32–36)
MCV RBC AUTO: 96 FL (ref 80–100)
NRBC BLD-RTO: 0 /100 WBCS (ref 0–0)
PLATELET # BLD AUTO: 170 X10*3/UL (ref 150–450)
POTASSIUM SERPL-SCNC: 4.5 MMOL/L (ref 3.5–5.3)
PROTHROMBIN TIME: 10.8 SECONDS (ref 9.8–12.8)
RBC # BLD AUTO: 4 X10*6/UL (ref 4–5.2)
SODIUM SERPL-SCNC: 141 MMOL/L (ref 136–145)
WBC # BLD AUTO: 4.6 X10*3/UL (ref 4.4–11.3)

## 2024-08-06 PROCEDURE — 85027 COMPLETE CBC AUTOMATED: CPT

## 2024-08-06 PROCEDURE — 80048 BASIC METABOLIC PNL TOTAL CA: CPT

## 2024-08-06 PROCEDURE — 36415 COLL VENOUS BLD VENIPUNCTURE: CPT

## 2024-08-06 PROCEDURE — 85610 PROTHROMBIN TIME: CPT

## 2024-08-12 ENCOUNTER — APPOINTMENT (OUTPATIENT)
Dept: CARDIOLOGY | Facility: HOSPITAL | Age: 79
End: 2024-08-12
Payer: MEDICARE

## 2024-08-12 ENCOUNTER — HOSPITAL ENCOUNTER (OUTPATIENT)
Facility: HOSPITAL | Age: 79
Setting detail: OUTPATIENT SURGERY
Discharge: HOME | End: 2024-08-12
Attending: INTERNAL MEDICINE | Admitting: INTERNAL MEDICINE
Payer: MEDICARE

## 2024-08-12 VITALS
BODY MASS INDEX: 21.27 KG/M2 | HEIGHT: 65 IN | WEIGHT: 127.65 LBS | OXYGEN SATURATION: 98 % | DIASTOLIC BLOOD PRESSURE: 69 MMHG | SYSTOLIC BLOOD PRESSURE: 150 MMHG | RESPIRATION RATE: 18 BRPM | TEMPERATURE: 97 F | HEART RATE: 64 BPM

## 2024-08-12 DIAGNOSIS — R07.9 CHEST PAIN, UNSPECIFIED TYPE: ICD-10-CM

## 2024-08-12 DIAGNOSIS — R55 NEAR SYNCOPE: ICD-10-CM

## 2024-08-12 DIAGNOSIS — R42 DIZZINESS: ICD-10-CM

## 2024-08-12 DIAGNOSIS — R06.02 SHORTNESS OF BREATH: ICD-10-CM

## 2024-08-12 DIAGNOSIS — R00.2 PALPITATIONS: Primary | ICD-10-CM

## 2024-08-12 LAB
ATRIAL RATE: 58 BPM
P AXIS: 73 DEGREES
P OFFSET: 159 MS
P ONSET: 109 MS
PR INTERVAL: 176 MS
Q ONSET: 197 MS
QRS COUNT: 9 BEATS
QRS DURATION: 124 MS
QT INTERVAL: 454 MS
QTC CALCULATION(BAZETT): 445 MS
QTC FREDERICIA: 448 MS
R AXIS: 13 DEGREES
T AXIS: 12 DEGREES
T OFFSET: 424 MS
VENTRICULAR RATE: 58 BPM

## 2024-08-12 PROCEDURE — 2780000003 HC OR 278 NO HCPCS: Performed by: INTERNAL MEDICINE

## 2024-08-12 PROCEDURE — 93005 ELECTROCARDIOGRAM TRACING: CPT | Mod: 59

## 2024-08-12 PROCEDURE — 7100000009 HC PHASE TWO TIME - INITIAL BASE CHARGE: Performed by: INTERNAL MEDICINE

## 2024-08-12 PROCEDURE — 7100000010 HC PHASE TWO TIME - EACH INCREMENTAL 1 MINUTE: Performed by: INTERNAL MEDICINE

## 2024-08-12 PROCEDURE — 2500000005 HC RX 250 GENERAL PHARMACY W/O HCPCS: Performed by: INTERNAL MEDICINE

## 2024-08-12 PROCEDURE — 2500000004 HC RX 250 GENERAL PHARMACY W/ HCPCS (ALT 636 FOR OP/ED): Performed by: NURSE PRACTITIONER

## 2024-08-12 PROCEDURE — C1764 EVENT RECORDER, CARDIAC: HCPCS | Performed by: INTERNAL MEDICINE

## 2024-08-12 PROCEDURE — 2500000001 HC RX 250 WO HCPCS SELF ADMINISTERED DRUGS (ALT 637 FOR MEDICARE OP): Performed by: INTERNAL MEDICINE

## 2024-08-12 PROCEDURE — 33285 INSJ SUBQ CAR RHYTHM MNTR: CPT | Performed by: INTERNAL MEDICINE

## 2024-08-12 PROCEDURE — 2500000004 HC RX 250 GENERAL PHARMACY W/ HCPCS (ALT 636 FOR OP/ED): Performed by: INTERNAL MEDICINE

## 2024-08-12 PROCEDURE — 93010 ELECTROCARDIOGRAM REPORT: CPT | Performed by: INTERNAL MEDICINE

## 2024-08-12 DEVICE — ICM LNQ22 LINQ II USA
Type: IMPLANTABLE DEVICE | Site: CHEST | Status: FUNCTIONAL
Brand: LINQ II™

## 2024-08-12 RX ORDER — MUPIROCIN 20 MG/G
1 OINTMENT TOPICAL ONCE
Status: COMPLETED | OUTPATIENT
Start: 2024-08-12 | End: 2024-08-12

## 2024-08-12 RX ORDER — LIDOCAINE HYDROCHLORIDE 10 MG/ML
INJECTION, SOLUTION EPIDURAL; INFILTRATION; INTRACAUDAL; PERINEURAL AS NEEDED
Status: DISCONTINUED | OUTPATIENT
Start: 2024-08-12 | End: 2024-08-12 | Stop reason: HOSPADM

## 2024-08-12 RX ORDER — ONDANSETRON HYDROCHLORIDE 2 MG/ML
4 INJECTION, SOLUTION INTRAVENOUS EVERY 8 HOURS PRN
Status: DISCONTINUED | OUTPATIENT
Start: 2024-08-12 | End: 2024-08-12 | Stop reason: HOSPADM

## 2024-08-12 RX ORDER — CEFAZOLIN SODIUM 1 G/50ML
1 SOLUTION INTRAVENOUS ONCE
Status: COMPLETED | OUTPATIENT
Start: 2024-08-12 | End: 2024-08-12

## 2024-08-12 RX ORDER — SODIUM CHLORIDE 9 MG/ML
20 INJECTION, SOLUTION INTRAVENOUS CONTINUOUS
Status: DISCONTINUED | OUTPATIENT
Start: 2024-08-12 | End: 2024-08-12 | Stop reason: HOSPADM

## 2024-08-12 RX ORDER — CHLORHEXIDINE GLUCONATE 40 MG/ML
SOLUTION TOPICAL ONCE
Status: COMPLETED | OUTPATIENT
Start: 2024-08-12 | End: 2024-08-12

## 2024-08-12 RX ORDER — ACETAMINOPHEN 325 MG/1
650 TABLET ORAL EVERY 4 HOURS PRN
Status: DISCONTINUED | OUTPATIENT
Start: 2024-08-12 | End: 2024-08-12 | Stop reason: HOSPADM

## 2024-08-12 RX ORDER — LATANOPROST 50 UG/ML
1 SOLUTION/ DROPS OPHTHALMIC NIGHTLY
COMMUNITY

## 2024-08-12 ASSESSMENT — PAIN SCALES - GENERAL
PAINLEVEL_OUTOF10: 0 - NO PAIN

## 2024-08-12 ASSESSMENT — COLUMBIA-SUICIDE SEVERITY RATING SCALE - C-SSRS
6. HAVE YOU EVER DONE ANYTHING, STARTED TO DO ANYTHING, OR PREPARED TO DO ANYTHING TO END YOUR LIFE?: NO
1. IN THE PAST MONTH, HAVE YOU WISHED YOU WERE DEAD OR WISHED YOU COULD GO TO SLEEP AND NOT WAKE UP?: NO
2. HAVE YOU ACTUALLY HAD ANY THOUGHTS OF KILLING YOURSELF?: NO

## 2024-08-12 ASSESSMENT — PAIN - FUNCTIONAL ASSESSMENT
PAIN_FUNCTIONAL_ASSESSMENT: 0-10

## 2024-08-12 NOTE — Clinical Note
Ancef infused Cheek-To-Nose Interpolation Flap Text: A decision was made to reconstruct the defect utilizing an interpolation axial flap and a staged reconstruction.  A telfa template was made of the defect.  This telfa template was then used to outline the Cheek-To-Nose Interpolation flap.  The donor area for the pedicle flap was then injected with anesthesia.  The flap was excised through the skin and subcutaneous tissue down to the layer of the underlying musculature.  The interpolation flap was carefully excised within this deep plane to maintain its blood supply.  The edges of the donor site were undermined.   The donor site was closed in a primary fashion.  The pedicle was then rotated into position and sutured.  Once the tube was sutured into place, adequate blood supply was confirmed with blanching and refill.  The pedicle was then wrapped with xeroform gauze and dressed appropriately with a telfa and gauze bandage to ensure continued blood supply and protect the attached pedicle.

## 2024-08-12 NOTE — PRE-SEDATION DOCUMENTATION
Sedation Plan      Mallampati class: II.    Risks, benefits, and alternatives discussed with patient.    Patient opts for no sedation.  Plan for local anesthetic

## 2024-08-12 NOTE — SIGNIFICANT EVENT
Patient ambulated to bathroom with assist x 1 without difficulty. Left pectoral site remains stable. No complaint of pain at this time. Patient care ongoing.

## 2024-08-12 NOTE — NURSING NOTE
Patient states understanding of discharge instructions as given via verbal teach back.  Follow up appointments and medications reviewed wit patient.

## 2024-08-12 NOTE — Clinical Note
Patient ID band present and verified. Patient contact is not present. Patient does not want anyone called post procedure

## 2024-08-12 NOTE — DISCHARGE INSTRUCTIONS
Home going instructions after a loop recorder implant     After a procedure using sedation  You should return home and rest for the remainder of the day and evening.   It is recommended a responsible adult be with you for the first 24 hours after the procedure.  Do not make any legal decisions for 24 hours after your procedure.  Do not drink alcoholic beverages for 24 hours after your procedure.    Wound care  Leave the surgical dressing in place for 7 days post implant  The dressing will be removed at the 1 week follow up appointment.    The dressing is water resistant.    You may shower and avoid water directly hitting the dressing.     Inspect your incisional site/ dressing each day  It is normal for the area around the incision to be tender for a few weeks following surgery.    Apply ice to the site 3-4 times per day in 20 minute intervals for at least 2 days after surgery.    Pain relievers such as Tylenol or Motrin are usually sufficient for pain relief.      Activity  Avoid driving for 24 hours  If you have had passing out spells or previously restricted from driving, discuss driving restrictions with your doctor.    Report to your physician    Increased redness, swelling, drainage or gaping of your incisional site  Increased pain at site unrelieved by pain medication  Fever or chills prior to the 1 week appointment  Bright red bleeding from the incisional site or complete saturation of the dressing  Dizziness, lightheadedness, or passing out    Remote monitoring/ Device ID card  You have been instructed by the device company representative regarding remote home monitoring.   There are multiple types of home monitoring units, please follow the instructions given  If you have questions please contact the device clinic for further instruction  After implant you will receive a temporary card.    Permanent card will come in the mail in the next few weeks.  It is important that you carry your ID card with you  at all times.      Follow up appointments  Incision (wound) check in 1 week  Appointment for device check and provider follow up in the next 4 months   These appointments will be scheduled and appear on your after visit summary

## 2024-08-12 NOTE — INTERVAL H&P NOTE
Late entry due to computer issue:    H&P reviewed. The patient was examined and there are no changes to the H&P.    In addition,  She feels ok.    Other ROS negative.    VS noted.  General  Pleasant. In no acute distress.  HEENT  EOMI  Neck   Trachea midline  Cardiovascular  Inspection:  JVD: none  Precordial bulge: none  Palpation:   Quality: excellent  Precordium: normal impulses  Auscultation:  Quality of auscultation: adequate  S1: normal intensity and splitting  S2: normal intensity and splitting  Clicks: none  Gallops: none  Rub: none  Systolic murmurs: none  Diastolic murmurs: none  Pulses:  2+= radial, brachial, carotid  Lungs  Clear  Ext  No edema  Neuro  O x 3    Shared decision making performed. Preop cardiac evaluation completed. Econsent confirmed.  Greater than 50% visit for discussion of preop cardiac evaluation and loop recorder. All questions answered.

## 2024-08-13 NOTE — SIGNIFICANT EVENT
"Patient recovering well without complications.  Patient states that everyone who took care of her \"was wonderful\".   "

## 2024-08-19 ENCOUNTER — APPOINTMENT (OUTPATIENT)
Dept: CARDIOLOGY | Facility: CLINIC | Age: 79
End: 2024-08-19
Payer: MEDICARE

## 2024-08-19 DIAGNOSIS — R42 DIZZINESS: ICD-10-CM

## 2024-08-19 DIAGNOSIS — R06.02 SHORTNESS OF BREATH: ICD-10-CM

## 2024-08-19 DIAGNOSIS — R55 NEAR SYNCOPE: ICD-10-CM

## 2024-08-19 NOTE — PROGRESS NOTES
Pt. here for wound check of loop implant by Dr. Cordero on 8/12/24 at [facility]. Sternal area is well approximated with no erythema or drainage. Pt. denies any fever or chills. Temp 98.6

## 2024-08-30 ENCOUNTER — HOSPITAL ENCOUNTER (OUTPATIENT)
Dept: CARDIOLOGY | Facility: HOSPITAL | Age: 79
Discharge: HOME | End: 2024-08-30
Payer: MEDICARE

## 2024-08-30 DIAGNOSIS — Z95.818 STATUS POST PLACEMENT OF IMPLANTABLE LOOP RECORDER: ICD-10-CM

## 2024-09-13 ENCOUNTER — HOSPITAL ENCOUNTER (OUTPATIENT)
Dept: CARDIOLOGY | Facility: HOSPITAL | Age: 79
Discharge: HOME | End: 2024-09-13
Payer: MEDICARE

## 2024-09-13 DIAGNOSIS — Z95.818 STATUS POST PLACEMENT OF IMPLANTABLE LOOP RECORDER: ICD-10-CM

## 2024-09-13 PROCEDURE — 93298 REM INTERROG DEV EVAL SCRMS: CPT

## 2024-09-27 ENCOUNTER — HOSPITAL ENCOUNTER (OUTPATIENT)
Dept: CARDIOLOGY | Facility: HOSPITAL | Age: 79
Discharge: HOME | End: 2024-09-27
Payer: MEDICARE

## 2024-09-27 DIAGNOSIS — Z95.818 STATUS POST PLACEMENT OF IMPLANTABLE LOOP RECORDER: ICD-10-CM

## 2024-10-18 ENCOUNTER — HOSPITAL ENCOUNTER (OUTPATIENT)
Dept: CARDIOLOGY | Facility: HOSPITAL | Age: 79
Discharge: HOME | End: 2024-10-18
Payer: MEDICARE

## 2024-10-18 DIAGNOSIS — Z95.818 STATUS POST PLACEMENT OF IMPLANTABLE LOOP RECORDER: ICD-10-CM

## 2024-10-18 PROCEDURE — 93298 REM INTERROG DEV EVAL SCRMS: CPT

## 2024-11-18 DIAGNOSIS — R00.2 PALPITATIONS: ICD-10-CM

## 2024-11-18 DIAGNOSIS — I47.19 AVNRT (AV NODAL RE-ENTRY TACHYCARDIA) (CMS-HCC): ICD-10-CM

## 2024-11-18 RX ORDER — ATENOLOL 25 MG/1
TABLET ORAL
Qty: 225 TABLET | Refills: 3 | Status: SHIPPED | OUTPATIENT
Start: 2024-11-18

## 2024-11-18 NOTE — TELEPHONE ENCOUNTER
Received request for prescription refills for patient.   Patient follows with Dr. Cordero and Dr. Paige     Request is for Atenolol 25mg take 1 tab in the morning, and a 1/2 tab in the evening.  May take 1 extra tab daily for breakthrough palpitations  Is patient currently on medication yes    Last OV 7/2/24  Next OV 12/10/24    Pended for signing and sent to provider

## 2024-11-22 ENCOUNTER — HOSPITAL ENCOUNTER (OUTPATIENT)
Dept: CARDIOLOGY | Facility: HOSPITAL | Age: 79
Discharge: HOME | End: 2024-11-22
Payer: MEDICARE

## 2024-11-22 DIAGNOSIS — Z95.818 STATUS POST PLACEMENT OF IMPLANTABLE LOOP RECORDER: ICD-10-CM

## 2024-11-22 PROCEDURE — 93298 REM INTERROG DEV EVAL SCRMS: CPT | Performed by: INTERNAL MEDICINE

## 2024-11-22 PROCEDURE — 93298 REM INTERROG DEV EVAL SCRMS: CPT

## 2024-12-06 ENCOUNTER — HOSPITAL ENCOUNTER (OUTPATIENT)
Dept: CARDIOLOGY | Facility: HOSPITAL | Age: 79
Discharge: HOME | End: 2024-12-06
Payer: MEDICARE

## 2024-12-06 DIAGNOSIS — Z95.818 STATUS POST PLACEMENT OF IMPLANTABLE LOOP RECORDER: ICD-10-CM

## 2024-12-10 ENCOUNTER — APPOINTMENT (OUTPATIENT)
Dept: CARDIOLOGY | Facility: CLINIC | Age: 79
End: 2024-12-10
Payer: MEDICARE

## 2024-12-10 ENCOUNTER — HOSPITAL ENCOUNTER (OUTPATIENT)
Dept: CARDIOLOGY | Facility: HOSPITAL | Age: 79
Discharge: HOME | End: 2024-12-10
Payer: MEDICARE

## 2024-12-10 VITALS
WEIGHT: 122 LBS | HEART RATE: 60 BPM | HEIGHT: 64 IN | SYSTOLIC BLOOD PRESSURE: 118 MMHG | DIASTOLIC BLOOD PRESSURE: 72 MMHG | BODY MASS INDEX: 20.83 KG/M2

## 2024-12-10 DIAGNOSIS — R55 NEAR SYNCOPE: ICD-10-CM

## 2024-12-10 DIAGNOSIS — R00.2 PALPITATIONS: ICD-10-CM

## 2024-12-10 DIAGNOSIS — I47.19 AVNRT (AV NODAL RE-ENTRY TACHYCARDIA) (CMS-HCC): ICD-10-CM

## 2024-12-10 DIAGNOSIS — R06.02 SHORTNESS OF BREATH: ICD-10-CM

## 2024-12-10 DIAGNOSIS — R07.9 CHEST PAIN, UNSPECIFIED TYPE: ICD-10-CM

## 2024-12-10 DIAGNOSIS — I48.91 ATRIAL FIBRILLATION, UNSPECIFIED TYPE (MULTI): ICD-10-CM

## 2024-12-10 DIAGNOSIS — Z78.9 NEVER SMOKED TOBACCO: ICD-10-CM

## 2024-12-10 DIAGNOSIS — I48.92 ATRIAL FLUTTER, UNSPECIFIED TYPE (MULTI): ICD-10-CM

## 2024-12-10 DIAGNOSIS — R42 DIZZINESS: ICD-10-CM

## 2024-12-10 DIAGNOSIS — Z71.89 ENCOUNTER TO DISCUSS TREATMENT OPTIONS: ICD-10-CM

## 2024-12-10 DIAGNOSIS — Z95.818 IMPLANTABLE LOOP RECORDER PRESENT: Primary | ICD-10-CM

## 2024-12-10 DIAGNOSIS — Z71.89 ENCOUNTER FOR MEDICATION REVIEW AND COUNSELING: ICD-10-CM

## 2024-12-10 PROCEDURE — 93291 INTERROG DEV EVAL SCRMS IP: CPT

## 2024-12-10 PROCEDURE — 1159F MED LIST DOCD IN RCRD: CPT | Performed by: INTERNAL MEDICINE

## 2024-12-10 PROCEDURE — 93291 INTERROG DEV EVAL SCRMS IP: CPT | Performed by: INTERNAL MEDICINE

## 2024-12-10 PROCEDURE — 99214 OFFICE O/P EST MOD 30 MIN: CPT | Performed by: INTERNAL MEDICINE

## 2024-12-10 PROCEDURE — 1036F TOBACCO NON-USER: CPT | Performed by: INTERNAL MEDICINE

## 2024-12-10 PROCEDURE — 93000 ELECTROCARDIOGRAM COMPLETE: CPT | Mod: DISTINCT PROCEDURAL SERVICE | Performed by: INTERNAL MEDICINE

## 2024-12-10 ASSESSMENT — ENCOUNTER SYMPTOMS
CHANGE IN BOWEL HABIT: 1
PALPITATIONS: 0
DIZZINESS: 1
DYSPNEA ON EXERTION: 0

## 2024-12-10 NOTE — PROGRESS NOTES
"Chief Complaint:   Follow-up (Patient is present for 4 month follow up with device check /)     History Of Present Illness:    Corazon Regalado is a 79 y.o. female presenting with follow-up.  She is accompanied by her .  She rarely has dizziness if she tries to get up when she is exercising on the floor.    Patient denies any arrhythmia symptoms of palpitation, near syncope, or syncope.    Device site healed well.  Last Recorded Vitals:  Vitals:    12/10/24 1452   BP: 118/72   BP Location: Right arm   Patient Position: Sitting   Pulse: 60   Weight: 55.3 kg (122 lb)   Height: 1.626 m (5' 4\")       Past Medical History:  See List    Past Surgical History:  See List      Social History:  She reports that she has never smoked. She has never used smokeless tobacco. She reports current alcohol use. She reports that she does not use drugs.    Family History:  Family History   Problem Relation Name Age of Onset    Hypertension Mother Celia Ocampo     Other (angina pectoris) Mother Celia Ocampo     Angina Mother Celia Ocampo     Stroke Father Ty Ocampo     Other (CABG) Father Ty Ocampo     Other (heart problem) Father Ty Ocampo     Other (PTCA) Father Ty Ocampo     Diabetes type I Father's Sister Sharmila         Allergies:  Ciprofloxacin and Propranolol    Outpatient Medications:  Current Outpatient Medications   Medication Instructions    acetaminophen (TYLENOL) 500 mg, Every 6 hours PRN    atenolol (Tenormin) 25 mg tablet TAKE 1 TAB IN MORNING AND 1/2 TAB IN EVENING. TAKE 1 EXTRA TAB DAILY FOR BREAKTHROUGH PALPITATIONS    BIOTIN ORAL 5,000 mcg, Daily    CALCIUM CITRATE-VITAMIN D3 ORAL Take by mouth. TAKE PO AS DIRECTED PER PACKAGE INSTRUCTIONS.    cholecalciferol (VITAMIN D-3) 25 mcg, Daily    L. acidophilus/Bifid. animalis 32 billion cell capsule 1 capsule, Daily    lactase (LACTAID) 3,000 Units, 3 times daily (morning, midday, late afternoon)    latanoprost (Xalatan) 0.005 " % ophthalmic solution 1 drop, Nightly    magnesium oxide (MAG-OX) 400 mg, oral, 2 times daily    multivitamin tablet 1 tablet, Daily    mv-min-FA-vit K-lutein-zeaxant (PreserVision AREDS 2 Plus MV) 200 mcg-15 mcg- 5 mg-1 mg capsule 1 capsule, 2 times daily    polyethylene glycol (MIRALAX) 17 g, Daily RT    potassium citrate CR (Urocit-K-10) 10 mEq ER tablet 10 mEq, Daily    primidone (MYSOLINE) 50 mg    pseudoephed/acetaminoph/diphen (MEDI-TABS ALLERGY SINUS ORAL) 1 tablet, Daily    rifAXIMin (XIFAXAN) 550 mg, 3 times daily    SIMETHICONE ORAL 125 mg, Daily    vitamin k 100 mcg, Daily   Review of Systems   Cardiovascular:  Negative for chest pain, dyspnea on exertion and palpitations.   Gastrointestinal:  Positive for change in bowel habit.   Neurological:  Positive for dizziness.   All other systems reviewed and are negative.    She has small intestinal bacterial overgrowth syndrome and receives antibiotics and treatments.  I did discuss with her association of diarrhea, vomiting, loose stool, loss of electrolytes,  change in electrolytes, and arrhythmias.    Physical Exam:  Constitutional:       General: Awake.      Appearance: Normal and healthy appearance. Well-developed and not in distress.   Neck:      Vascular: No JVR. JVD normal.   Pulmonary:      Effort: Pulmonary effort is normal.      Breath sounds: Normal breath sounds. No wheezing. No rhonchi. No rales.   Chest:      Chest wall: Not tender to palpatation.      Comments: Loop recorder in place  Cardiovascular:      PMI at left midclavicular line. Normal rate. Regular rhythm. Normal S1. Normal S2.       Murmurs: There is no murmur.      No gallop.  No click. No rub.   Pulses:     Intact distal pulses.   Edema:     Peripheral edema absent.   Abdominal:      Tenderness: There is no abdominal tenderness.   Musculoskeletal: Normal range of motion.         General: No tenderness. Skin:     General: Skin is warm and dry.   Neurological:      General: No focal  "deficit present.      Mental Status: Alert and oriented to person, place and time.            Last Labs:  CBC -  Lab Results   Component Value Date    WBC 4.6 08/06/2024    HGB 11.9 (L) 08/06/2024    HCT 38.5 08/06/2024    MCV 96 08/06/2024     08/06/2024       CMP -  Lab Results   Component Value Date    CALCIUM 9.3 08/06/2024       LIPID PANEL -   No results found for: \"CHOL\", \"TRIG\", \"HDL\", \"CHHDL\", \"LDLF\", \"VLDL\", \"NHDL\"    RENAL FUNCTION PANEL -   Lab Results   Component Value Date    GLUCOSE 74 08/06/2024     08/06/2024    K 4.5 08/06/2024     08/06/2024    CO2 34 (H) 08/06/2024    ANIONGAP 9 (L) 08/06/2024    BUN 20 08/06/2024    CREATININE 0.79 08/06/2024    CALCIUM 9.3 08/06/2024        No results found for: \"BNP\", \"HGBA1C\"    Last Cardiology Tests:  ECG:  ECG 12 Lead 08/12/2024    Today.  Normal sinus rhythm.  Normal axis.  Corrected QT interval 430 ms.  Right bundle branch block    Device evaluation today.  [employer] L NQ22.  Battery status okay.  No significant events.  Occasional PVCs.  Occasional noise oversensing.  Echo:  Transthoracic Echo (TTE) Complete 03/15/2024      Cardiac Imaging:  CT angio coronary art with heartflow if score >30% 03/08/2024        Lab review: I have personally reviewed the laboratory result(s) see above    Assessment/Plan   Diagnoses and all orders for this visit:  Implantable loop recorder present  Atrial fibrillation, unspecified type (Multi)  Palpitations  Dizziness  Near syncope  Atrial flutter, unspecified type (Multi)  AVNRT (AV noah re-entry tachycardia) ([facility])  Chest pain, unspecified type  Never smoked tobacco  Shortness of breath  BMI 20.0-20.9, adult  Encounter for medication review and counseling  Encounter to discuss treatment options        Margo Dominguez RN    Palpitation with no recently documented arrhythmia.  Remote history of AVNRT status post ablation 2004 and atrial flutter status post ablation 2005.  Doing well with empirical " magnesium oxide.  Status post loop recorder.  [employer] L NQ22 loop recorder.  Normal device function.  Reviewed device checks and standard care for device follow-up with patient has been.  All questions answered.  Reassured her that no arrhythmias have been documented to date.  Will continue to monitor.   Normal LVEF. Mild valvular heart disease. Mild CAD by CT. Exertional dyspnea.  Reportedly negative re,mct, and chest x-ray.    Other medical issues of essential tremor, GERD, artery stenosis, peptic ulcer disease, nephrolithiasis.     Counseling greater than 50% of visit  performed.  The patient, , and I discussed normal conduction, bradycardia, tachycardia, no documented arrhythmias on device check, serial monitoring with device check, device clinic today notified by patient if symptoms still that the remote loop recorder check can be brought up in priority, indications and if refills needed for medications, treatment options, risk, benefits, imponderables, and preoperative cardiac evaluation.  Informed consent obtained.  Modifications also reviewed.  All questions answered.  Patient and family appreciative care.

## 2024-12-10 NOTE — PATIENT INSTRUCTIONS
Continue same medications/treatment.  Patient educated on proper medication use.  Patient educated on risk factor modification.  Please bring any lab results from other providers/physicians to your next appointment.    Please bring all medicines, vitamins, and herbal supplements with you when you come to the office.    Prescriptions will not be filled unless you are compliant with your follow up appointments or have a follow up appointment scheduled as per instruction of your physician. Refills should be requested at the time of your visit.    Follow up with Dr. Cordero in 6 months with device check  Continue monthly remote checks    EMERALD SUAREZ RN, AM SCRIBING FOR AND IN THE PRESENCE OF DR. BRANDON CORDERO MD, FACC, FACP, RS    
Adult

## 2024-12-27 ENCOUNTER — HOSPITAL ENCOUNTER (OUTPATIENT)
Dept: CARDIOLOGY | Facility: HOSPITAL | Age: 79
Discharge: HOME | End: 2024-12-27
Payer: MEDICARE

## 2024-12-27 DIAGNOSIS — Z95.818 IMPLANTABLE LOOP RECORDER PRESENT: ICD-10-CM

## 2025-01-17 ENCOUNTER — HOSPITAL ENCOUNTER (OUTPATIENT)
Dept: CARDIOLOGY | Facility: HOSPITAL | Age: 80
Discharge: HOME | End: 2025-01-17
Payer: MEDICARE

## 2025-01-17 DIAGNOSIS — Z95.818 IMPLANTABLE LOOP RECORDER PRESENT: ICD-10-CM

## 2025-01-17 PROCEDURE — 93298 REM INTERROG DEV EVAL SCRMS: CPT

## 2025-01-24 ENCOUNTER — HOSPITAL ENCOUNTER (OUTPATIENT)
Dept: CARDIOLOGY | Facility: HOSPITAL | Age: 80
Discharge: HOME | End: 2025-01-24
Payer: MEDICARE

## 2025-01-24 DIAGNOSIS — Z95.818 IMPLANTABLE LOOP RECORDER PRESENT: ICD-10-CM

## 2025-02-07 ENCOUNTER — HOSPITAL ENCOUNTER (OUTPATIENT)
Dept: CARDIOLOGY | Facility: HOSPITAL | Age: 80
Discharge: HOME | End: 2025-02-07
Payer: MEDICARE

## 2025-02-07 DIAGNOSIS — Z95.818 IMPLANTABLE LOOP RECORDER PRESENT: ICD-10-CM

## 2025-02-21 ENCOUNTER — HOSPITAL ENCOUNTER (OUTPATIENT)
Dept: CARDIOLOGY | Facility: HOSPITAL | Age: 80
Discharge: HOME | End: 2025-02-21
Payer: MEDICARE

## 2025-02-21 DIAGNOSIS — Z95.818 IMPLANTABLE LOOP RECORDER PRESENT: ICD-10-CM

## 2025-02-21 PROCEDURE — 93298 REM INTERROG DEV EVAL SCRMS: CPT

## 2025-02-28 ENCOUNTER — HOSPITAL ENCOUNTER (OUTPATIENT)
Dept: CARDIOLOGY | Facility: HOSPITAL | Age: 80
Discharge: HOME | End: 2025-02-28
Payer: MEDICARE

## 2025-02-28 ENCOUNTER — PATIENT MESSAGE (OUTPATIENT)
Dept: CARDIOLOGY | Facility: CLINIC | Age: 80
End: 2025-02-28
Payer: MEDICARE

## 2025-02-28 DIAGNOSIS — Z95.818 IMPLANTABLE LOOP RECORDER PRESENT: ICD-10-CM

## 2025-03-28 ENCOUNTER — HOSPITAL ENCOUNTER (OUTPATIENT)
Dept: CARDIOLOGY | Facility: HOSPITAL | Age: 80
Discharge: HOME | End: 2025-03-28
Payer: MEDICARE

## 2025-03-28 DIAGNOSIS — Z95.818 IMPLANTABLE LOOP RECORDER PRESENT: ICD-10-CM

## 2025-03-28 PROCEDURE — 93298 REM INTERROG DEV EVAL SCRMS: CPT

## 2025-03-28 PROCEDURE — 93298 REM INTERROG DEV EVAL SCRMS: CPT | Performed by: INTERNAL MEDICINE

## 2025-04-09 ENCOUNTER — OFFICE VISIT (OUTPATIENT)
Dept: CARDIOLOGY | Facility: CLINIC | Age: 80
End: 2025-04-09
Payer: MEDICARE

## 2025-04-09 ENCOUNTER — APPOINTMENT (OUTPATIENT)
Dept: CARDIOLOGY | Facility: CLINIC | Age: 80
End: 2025-04-09
Payer: MEDICARE

## 2025-04-09 VITALS
WEIGHT: 115.8 LBS | HEART RATE: 64 BPM | BODY MASS INDEX: 19.77 KG/M2 | HEIGHT: 64 IN | DIASTOLIC BLOOD PRESSURE: 66 MMHG | SYSTOLIC BLOOD PRESSURE: 112 MMHG

## 2025-04-09 DIAGNOSIS — Z13.220 SCREENING FOR LIPID DISORDERS: ICD-10-CM

## 2025-04-09 DIAGNOSIS — R00.2 PALPITATIONS: ICD-10-CM

## 2025-04-09 DIAGNOSIS — Z78.9 NEVER SMOKED TOBACCO: ICD-10-CM

## 2025-04-09 DIAGNOSIS — I65.23 BILATERAL CAROTID ARTERY STENOSIS: ICD-10-CM

## 2025-04-09 DIAGNOSIS — I48.3 TYPICAL ATRIAL FLUTTER (MULTI): ICD-10-CM

## 2025-04-09 DIAGNOSIS — I35.1 AORTIC VALVE REGURGITATION, ACQUIRED: ICD-10-CM

## 2025-04-09 PROCEDURE — 99214 OFFICE O/P EST MOD 30 MIN: CPT | Performed by: INTERNAL MEDICINE

## 2025-04-09 PROCEDURE — 1036F TOBACCO NON-USER: CPT | Performed by: INTERNAL MEDICINE

## 2025-04-09 PROCEDURE — 1159F MED LIST DOCD IN RCRD: CPT | Performed by: INTERNAL MEDICINE

## 2025-04-09 NOTE — PROGRESS NOTES
CARDIOLOGY OFFICE VISIT      CHIEF COMPLAINT    Arrhythmia, coronary artery disease, aortic valve regurgitation    HISTORY OF PRESENT ILLNESS  The patient is a 79-year-old  female with past medical history significant for AV noah reentrant tachycardia, status post radiofrequency ablation 2004, atrial flutter, status post posterior corridor radiofrequency ablation 2005, who presents for follow-up cardiovascular care.     Patient has been doing better since her magnesium oxide was increased by Dr. Cordero.  She had a loop recorder placed.  Denies chest pain, dyspnea, palpitations, nausea, vomiting, dizziness, near-syncope, ny syncope, edema.  She does Silver sneakers twice a week with cardio for 20 minutes, resistant training, weight training.  She does home exercising daily.  Home blood pressure readings are normal.    REVIEW OF SYSTEMS: Negative, noncontributory or as previously mentioned x 12 systems.      Past medical history:  As above plus  Nephrolithiasis  Essential tremor  Gastric soft reflux disease  Vertigo        PAST SURGICAL HISTORY   1. Lumbar diskectomy.  2. Varicose vein stripping.  3. Tonsils and adenoidectomy.  4. Toe surgery for bone spur.   5. Laser lithotripsy     SOCIAL HISTORY: Denies tobacco use. Occasional alcohol use.      FAMILY HISTORY: Mom alive at 90 with angina. Dad alive at 88 with several  cerebrovascular accidents, status post pacemaker.      ASSESSMENT:     Atrioventricular noah reentrant tachycardia, status post radiofrequency ablation, 2004.  Atrial flutter, status post posterior corridor radiofrequency ablation, 2005.  Coronary artery disease-mild on CCTA March 11, 2024, CAC 53, 46 percentile  Aortic valve regurgitation  Carotid artery stenosis.  Dyspnea- Dr Nazario Velázquez [facility]  Peptic ulcer disease/bleeding ulcer status post clip July 2021  Essential tremor.  History of vertigo.  Gastroesophageal reflux disease - Dr Carlton [facility].  Nephrolithiasis -Dr Colon [facility]  Urology  Intolerance to aspirin due to bleeding ulcer        RECOMMENDATIONS:   Patient appears to be stable from a cardiac standpoint.  No symptoms of angina and no severe obstructive coronary artery disease on most recent cardiac CT of the coronary arteries.  No symptomatic arrhythmia.  No evidence of heart failure.  No symptomatic vascular disease.  No symptomatic valvular heart disease.  Continue exercise program as prescribed.  Blood pressures under control.  Follow-up with myself in December.  Check CMP, lipid profile at that time.     Please excuse any errors in grammar or translation related to this dictation. Voice recognition software was utilized to prepare this document.    Recent Cardiovascular Testing:  The following results have been reviewed and updated.     CRD: 5/5/09  1. Mild carotid disease.     MPX: 5/6/09  1. Normal.  2. EF 55-60%.     Labs: 7/1/24  1.T C 240,, LDL 1058 Trig 77  .     CTA Cardiac C. Score 6/7/16  1.Normal Coronary art.  2.Calcium score 0.     PATRICK: 11/13/18  1. No evidence of dysrhythmia.  2. No symptoms.     Echo: 3/15/24  1. EF 55%.     48 hour Holter  3/15/24  1.NSR,  67 BPM averagea  2.Min 50 bpm,max. 129 bpm  3.3 beat run atrial tachycardiac 156 bpm  4.Rare PV ectopic beats totaling 35.     CT Angio Coronary 3/11/24  1.-Mild Diffuse CAD  2.Coronary calcium score is 53. 46 th percentile for age ,race and gender.      VITALS  Vitals:    04/09/25 1254   BP: 112/66   Pulse: 64     Wt Readings from Last 4 Encounters:   12/10/24 55.3 kg (122 lb)   08/12/24 57.9 kg (127 lb 10.3 oz)   07/02/24 57.6 kg (127 lb)   06/13/24 57.2 kg (126 lb 1.6 oz)       PHYSICAL EXAM:  GENERAL:  Well developed, well nourished, in no acute distress.  CHEST:  Symmetric and nontender.  NEURO/PSYCH:  Alert and oriented times three with approppriate behavior and responses.  NECK:  Supple, no JVD, no bruit.  LUNGS:  Clear to auscultation bilaterally, normal respiratory effort.  HEART:  Rate and  rhythm regular with no evident murmur, no gallop appreciated.    There are no rubs, clicks or heaves.  EXTREMITIES:  Warm with good color, no clubbing or cyanosis.  There is no edema noted.  PERIPHERAL VASCULAR:  Pulses present and equally palpable; 2+ throughout.    ASSESSMENT AND PLAN  Problem List Items Addressed This Visit          Cardiac and Vasculature    Atrial flutter (Multi)    Relevant Orders    Comprehensive metabolic panel    Bilateral carotid artery stenosis    Palpitations    Relevant Orders    Comprehensive metabolic panel    Aortic valve regurgitation, acquired       Endocrine/Metabolic    BMI less than 19,adult       Tobacco    Never smoked tobacco     Other Visit Diagnoses       Screening for lipid disorders        Relevant Orders    Lipid panel            Past Medical History  Past Medical History:   Diagnosis Date    Abnormal ECG     Arrhythmia     Sleep apnea     Sleep apnea        Social History  Social History     Tobacco Use    Smoking status: Never    Smokeless tobacco: Never   Substance Use Topics    Alcohol use: Yes     Comment: Only have a drink several times a year    Drug use: Never       Family History     Family History   Problem Relation Name Age of Onset    Hypertension Mother Celia Ocampo     Other (angina pectoris) Mother Celia Ocampo     Angina Mother Celia Ocampo     Stroke Father Ty Ocampo     Other (CABG) Father Ty Ocampo     Other (heart problem) Father Ty Ocampo     Other (PTCA) Father Ty Ocampo     Diabetes type I Father's Sister Sharmila         Allergies:  Allergies   Allergen Reactions    Ciprofloxacin Unknown    Propranolol Unknown        Outpatient Medications:  Current Outpatient Medications   Medication Instructions    acetaminophen (TYLENOL) 500 mg, Every 6 hours PRN    atenolol (Tenormin) 25 mg tablet TAKE 1 TAB IN MORNING AND 1/2 TAB IN EVENING. TAKE 1 EXTRA TAB DAILY FOR BREAKTHROUGH PALPITATIONS    BIOTIN ORAL 5,000  "mcg, Daily    CALCIUM CITRATE-VITAMIN D3 ORAL Take by mouth. TAKE PO AS DIRECTED PER PACKAGE INSTRUCTIONS.    cholecalciferol (VITAMIN D-3) 25 mcg, Daily    L. acidophilus/Bifid. animalis 32 billion cell capsule 1 capsule, Daily    lactase (LACTAID) 3,000 Units, 3 times daily (morning, midday, late afternoon)    latanoprost (Xalatan) 0.005 % ophthalmic solution 1 drop, Nightly    magnesium oxide (MAG-OX) 400 mg, oral, 2 times daily    multivitamin tablet 1 tablet, Daily    mv-min-FA-vit K-lutein-zeaxant (PreserVision AREDS 2 Plus MV) 200 mcg-15 mcg- 5 mg-1 mg capsule 1 capsule, 2 times daily    polyethylene glycol (MIRALAX) 17 g, Daily RT    potassium citrate CR (Urocit-K-10) 10 mEq ER tablet 10 mEq, Daily    primidone (MYSOLINE) 50 mg    pseudoephed/acetaminoph/diphen (MEDI-TABS ALLERGY SINUS ORAL) 1 tablet, Daily    SIMETHICONE ORAL 125 mg, Daily    vitamin k 100 mcg, Daily        Recent Lab Results:    CMP:    Lab Results   Component Value Date     08/06/2024    K 4.5 08/06/2024     08/06/2024    CO2 34 (H) 08/06/2024    BUN 20 08/06/2024    CREATININE 0.79 08/06/2024    GLUCOSE 74 08/06/2024    CALCIUM 9.3 08/06/2024       Magnesium:    No results found for: \"MG\"    Lipid Profile:    No results found for: \"CHLPL\", \"TRIG\", \"HDL\", \"LDLCALC\", \"LDLDIRECT\"    TSH:    No results found for: \"TSH\"    BNP:   No results found for: \"BNP\"     PT/INR:    Lab Results   Component Value Date    PROTIME 10.8 08/06/2024    INR 1.0 08/06/2024       HgBA1c:    No results found for: \"HGBA1C\"    BMP:  Lab Results   Component Value Date     08/06/2024    K 4.5 08/06/2024     08/06/2024    CO2 34 (H) 08/06/2024    BUN 20 08/06/2024    CREATININE 0.79 08/06/2024    CREATININE 0.81 03/04/2024       CBC:  Lab Results   Component Value Date    WBC 4.6 08/06/2024    RBC 4.00 08/06/2024    HGB 11.9 (L) 08/06/2024    HCT 38.5 08/06/2024    MCV 96 08/06/2024    MCH 29.8 08/06/2024    MCHC 30.9 (L) 08/06/2024    RDW 13.6 " "08/06/2024     08/06/2024       Cardiac Enzymes:    No results found for: \"TROPHS\"    Hepatic Function Panel:    No results found for: \"ALKPHOS\", \"ALT\", \"AST\", \"PROT\", \"BILITOT\", \"BILIDIR\"      I,Priyank Gray LPN   am scribing for, and in the presence of  Dr. Edinson Paige DO   .    I, Dr. Edinson Paige DO   , personally performed the services described in the documentation as scribed by /Priyank Gray LPN   in my presence, and confirm it is both accurate and complete.    Dr. Edinson Paige DO  Thank you for allowing me to participate in the care of this patient. Please do not hesitate to contact me with any further questions or concerns.          "

## 2025-05-02 ENCOUNTER — HOSPITAL ENCOUNTER (OUTPATIENT)
Dept: CARDIOLOGY | Facility: HOSPITAL | Age: 80
Discharge: HOME | End: 2025-05-02
Payer: MEDICARE

## 2025-05-02 DIAGNOSIS — Z95.818 IMPLANTABLE LOOP RECORDER PRESENT: ICD-10-CM

## 2025-05-02 PROCEDURE — 93298 REM INTERROG DEV EVAL SCRMS: CPT

## 2025-06-06 ENCOUNTER — HOSPITAL ENCOUNTER (OUTPATIENT)
Dept: CARDIOLOGY | Facility: HOSPITAL | Age: 80
Discharge: HOME | End: 2025-06-06
Payer: MEDICARE

## 2025-06-06 DIAGNOSIS — Z95.818 IMPLANTABLE LOOP RECORDER PRESENT: ICD-10-CM

## 2025-06-06 PROCEDURE — 93298 REM INTERROG DEV EVAL SCRMS: CPT

## 2025-06-17 ENCOUNTER — APPOINTMENT (OUTPATIENT)
Dept: CARDIOLOGY | Facility: CLINIC | Age: 80
End: 2025-06-17
Payer: MEDICARE

## 2025-06-17 ENCOUNTER — HOSPITAL ENCOUNTER (OUTPATIENT)
Dept: CARDIOLOGY | Facility: HOSPITAL | Age: 80
Discharge: HOME | End: 2025-06-17
Payer: MEDICARE

## 2025-06-17 ENCOUNTER — APPOINTMENT (OUTPATIENT)
Dept: CARDIOLOGY | Facility: HOSPITAL | Age: 80
End: 2025-06-17
Payer: MEDICARE

## 2025-06-17 ENCOUNTER — OFFICE VISIT (OUTPATIENT)
Dept: CARDIOLOGY | Facility: CLINIC | Age: 80
End: 2025-06-17
Payer: MEDICARE

## 2025-06-17 VITALS
BODY MASS INDEX: 20.66 KG/M2 | HEART RATE: 61 BPM | WEIGHT: 121 LBS | SYSTOLIC BLOOD PRESSURE: 128 MMHG | HEIGHT: 64 IN | DIASTOLIC BLOOD PRESSURE: 64 MMHG

## 2025-06-17 DIAGNOSIS — I48.92 ATRIAL FLUTTER, UNSPECIFIED TYPE (MULTI): ICD-10-CM

## 2025-06-17 DIAGNOSIS — Z95.818 IMPLANTABLE LOOP RECORDER PRESENT: Primary | ICD-10-CM

## 2025-06-17 DIAGNOSIS — R00.2 PALPITATIONS: ICD-10-CM

## 2025-06-17 DIAGNOSIS — Z95.818 IMPLANTABLE LOOP RECORDER PRESENT: ICD-10-CM

## 2025-06-17 DIAGNOSIS — Z98.890 H/O CARDIAC RADIOFREQUENCY ABLATION: ICD-10-CM

## 2025-06-17 PROCEDURE — 99213 OFFICE O/P EST LOW 20 MIN: CPT | Performed by: PHYSICIAN ASSISTANT

## 2025-06-17 PROCEDURE — 1159F MED LIST DOCD IN RCRD: CPT | Performed by: PHYSICIAN ASSISTANT

## 2025-06-17 PROCEDURE — 93291 INTERROG DEV EVAL SCRMS IP: CPT

## 2025-06-17 PROCEDURE — 93000 ELECTROCARDIOGRAM COMPLETE: CPT | Mod: DISTINCT PROCEDURAL SERVICE | Performed by: INTERNAL MEDICINE

## 2025-06-17 PROCEDURE — 93291 INTERROG DEV EVAL SCRMS IP: CPT | Performed by: INTERNAL MEDICINE

## 2025-06-17 PROCEDURE — 1036F TOBACCO NON-USER: CPT | Performed by: PHYSICIAN ASSISTANT

## 2025-06-17 RX ORDER — LANOLIN ALCOHOL/MO/W.PET/CERES
1 CREAM (GRAM) TOPICAL 2 TIMES DAILY
Qty: 180 TABLET | Refills: 3 | Status: SHIPPED | OUTPATIENT
Start: 2025-06-17

## 2025-06-17 NOTE — PROGRESS NOTES
Electrophysiology Office Visit     CHIEF COMPLAINT  Chief Complaint   Patient presents with    Follow-up     6 month follow up with device check   mplantable loop recorder present  Atrial fibrillation, unspecified type (Multi)        Primary EP doctor: Dr Cordero  Primary Cardiologist: Dr Paige    EP History: palpitations s/p (LOOP 2024), hx AVNRT s/p ablation 2004, AFL s/p ablation 2005, RBBB, mild AI (2024), PUD/bleeding ulcer status post clip July 2021, Essential tremor, Vertigo, hx dyspnea w/ normal [place], MCT, and CXR per pulm.  9/16/2004 AVNRT Ablation   8/12/2005 AFL ablation  7/2/2024 NEW EP OV w/ Dr Cordero, referred by Dr Paige for palpitations. Dyspnea, near-syncope, and chest discomfort. Has palpitations up to 141pbm and up to 16 minutes per her Fitbit. 48 hour monitor was negative. No symptoms occurred during monitoring period. Home Kardia monitor has been negative. LOOP monitor discussed.   Maintained on atenolol 25 mg in the morning with 12-1/2 mg in the evening.  Take 1 extra tab daily for breakthrough palpitations.  Magnesium oxide 400 mg twice daily    HPI: 6/17/2025  79  year old female pmhx palpitations s/p (LOOP 2024), hx AVNRT s/p ablation 2004, AFL s/p ablation 2005, RBBB, mild AI (2024), PUD/bleeding ulcer status post clip July 2021, Essential tremor, Vertigo, hx dyspnea w/ normal [place], MCT, and CXR per pulm. Maintained on magnesium oxide 400 mg twice daily.    Here today for loop recorder interrogation.  No tachycardia, pauses, bradycardia, AT AF or self initiated episodes since 6/6/2025 remote.  Patient reports hospital ED visit on 5/24/2025 for vision loss in left eye.  No arrhythmic events recorded at that time.    6/6/25 even report of 5/31 show SR 80s, rare PVC and oversensing of noise    On 5/25, Memorial weekend, pt went to [facility] due to temporary left eye vision loss for about 20 mins. Work up was negative for vascular occlusion or stroke. CTA of the head and neck showed no  evidence of acute intracranial process, no evidence of stroke, no evidence of retinal detachment, no evidence of glaucoma exacerbation, no findings to suggest temporal arteritis or giant cell arteritis.  Vision began to return after 15-20 mins. ED note says she also c/o headache at that time but currently pt doesn't recall that.  BP was 188/80.      She did follow up with [facility] that same day and says they did not find any. She has a follow up on 6/25 with opthalmologist, Dr. Roque at [facility].    Today's EKG Interpretation: Normal sinus rhythm, right bundle branch block, rate 61 bpm, AZ interval 164 ms, QRS duration 124 ms, QTc 450 ms    VITALS  Vitals:    06/17/25 1428   BP: 128/64   Pulse: 61     Wt Readings from Last 4 Encounters:   06/17/25 54.9 kg (121 lb)   04/09/25 52.5 kg (115 lb 12.8 oz)   12/10/24 55.3 kg (122 lb)   08/12/24 57.9 kg (127 lb 10.3 oz)     PHYSICAL EXAM:  GENERAL:  Well developed, well nourished, in no acute distress.  NEURO/PSYCH:  No focal deficits. A&O x 3   LUNGS:  Clear to auscultation bilaterally. No wheezing, rhonci, or crackles  HEART:  RRR, no M/R/G.   EXTREMITIES:  Warm with good color, no clubbing or cyanosis.  No pitting edema.     Medical History[1]  Surgical History[2]  Social History[3]  Family History[4]  RX Allergies[5]   Current Outpatient Medications   Medication Instructions    acetaminophen (TYLENOL) 500 mg, Every 6 hours PRN    atenolol (Tenormin) 25 mg tablet TAKE 1 TAB IN MORNING AND 1/2 TAB IN EVENING. TAKE 1 EXTRA TAB DAILY FOR BREAKTHROUGH PALPITATIONS    BIOTIN ORAL 5,000 mcg, Daily    CALCIUM CITRATE-VITAMIN D3 ORAL Take by mouth. TAKE PO AS DIRECTED PER PACKAGE INSTRUCTIONS.    cholecalciferol (VITAMIN D-3) 25 mcg, Daily    L. acidophilus/Bifid. animalis 32 billion cell capsule 1 capsule, Daily    lactase (LACTAID) 3,000 Units, 3 times daily (morning, midday, late afternoon)    latanoprost (Xalatan) 0.005 % ophthalmic solution 1 drop, Nightly    magnesium  oxide (Mag-Ox) 400 mg (241.3 mg elemental) tablet 1 tablet, oral, 2 times daily    multivitamin tablet 1 tablet, Daily    phytonadione (VITAMIN K) 100 mcg, Daily    polyethylene glycol (MIRALAX) 17 g, Daily RT    potassium citrate CR (Urocit-K-10) 10 mEq ER tablet 10 mEq, Daily    primidone (MYSOLINE) 50 mg    pseudoephed/acetaminoph/diphen (MEDI-TABS ALLERGY SINUS ORAL) 1 tablet, Daily    SIMETHICONE ORAL 125 mg, Daily    vit A/vit C/vit E/zinc/copper (PRESERVISION AREDS ORAL) 2 mg, Daily     Relevant reports:   3/15/2024 ECHO  1. Left ventricular systolic function is normal with a 55-60% estimated ejection fraction.  2. Intact intraventricular septum without shunting or a ventricular septal defect.  3. No left ventricular thrombus visualized.  4. Spectral Doppler shows an impaired relaxation pattern of left ventricular diastolic filling.  5. There is no evidence of left ventricular hypertrophy.  6. No evidence of mitral valve prolapse.  7. There is No tricuspid stenosis.  8. RVSP within normal limits.  9. Aortic valve stenosis is not present.  10. Mild aortic valve regurgitation.     11/13/2018 ECHO  1. The left ventricle shows no LVH or LV dilatation with normal systolic function with an estimated ejection range of 55% with no apparent regional wall motion abnormalities   2. Normal LV diastolic filling pattern  3. Normal right ventricular size and function  4. Trileaflet aortic valve without any significant calcification and no stenosis with a peak velocity of 1.4 with mild 1+ aortic insufficiency, central jet, pressure halftime in the 350-400 range   5. Structurally normal mitral valve for age was trivial MR   6. Structurally normal tricuspid valve for age with trivial TR with no apparent elevation of RVSP   7. Normal pulmonic valve   8. Normal left and right atrium   9. Normal aortic root  10. No apparent pericardial effusion, echo producing mass or intracardiac shunt     3/8/2024  Cardiac CT angio  1. Mild  diffuse coronary artery disease without significant stenosis.  2. Coronary artery calcium score 53, 46th percentile for age, gender,and race in asymptomatic patients.          Problem List[6]     ASSESSMENT AND PLAN  Problem List Items Addressed This Visit       Atrial flutter (Multi)  9/16/2004 AVNRT Ablation   8/12/2005 AFL ablation    Palpitations    Relevant Medications    magnesium oxide (Mag-Ox) 400 mg (241.3 mg elemental) tablet    Other Relevant Orders    Follow Up In Cardiology    Implantable loop recorder present - Primary  No arrhythmias seen on loop  Follow up with Dr Cordero in 1 year w/ loop check    Relevant Orders    ECG 12 lead (Clinic Performed) (Completed)    H/O cardiac radiofrequency ablation for AVNRT and AFL        Kalli Vann [facility] [place]             [1]   Past Medical History:  Diagnosis Date    Abnormal ECG     Arrhythmia     Sleep apnea     Sleep apnea    [2]   Past Surgical History:  Procedure Laterality Date    ABLATION OF DYSRHYTHMIC FOCUS      CARDIAC ELECTROPHYSIOLOGY PROCEDURE Left 08/12/2024    Procedure: Loop Insertion;  Surgeon: Saundra Cordero MD;  Location: [facility];  Service: Electrophysiology;  Laterality: Left;  holding Preservision x7 days    CARDIAC ELECTROPHYSIOLOGY PROCEDURE  08/01/2024    llop recorder implant    OTHER SURGICAL HISTORY  05/06/2022    Back surgery    OTHER SURGICAL HISTORY  05/06/2022    Complete colonoscopy    OTHER SURGICAL HISTORY  05/06/2022    Eye surgery    OTHER SURGICAL HISTORY  05/06/2022    Renal lithotripsy    OTHER SURGICAL HISTORY  05/06/2022    Facial surgery    OTHER SURGICAL HISTORY  05/06/2022    Tonsillectomy    OTHER SURGICAL HISTORY  05/06/2022    Tubal ligation    VEIN SURGERY     [3]   Social History  Tobacco Use    Smoking status: Never    Smokeless tobacco: Never   Substance Use Topics    Alcohol use: Yes     Comment: Only have a drink several times a year    Drug use: Never   [4]   Family History  Problem Relation Name Age  of Onset    Hypertension Mother Celia Ocampo     Other (angina pectoris) Mother Celia Ocampo     Angina Mother Celia Ocampo     Stroke Father Ty Ocampo     Other (CABG) Father Ty Ocampo     Other (heart problem) Father Ty Ocampo     Other (PTCA) Father Ty Ocampo     Diabetes type I Father's Sister Sharmila    [5]   Allergies  Allergen Reactions    Ciprofloxacin Unknown    Propranolol Unknown    Sulfamethoxazole-Trimethoprim GI Upset   [6]   Patient Active Problem List  Diagnosis    Atrial fibrillation (Multi)    Atrial flutter (Multi)    AVNRT (AV noah re-entry tachycardia)    Bilateral carotid artery stenosis    Chest pain    GERD (gastroesophageal reflux disease)    Palpitations    PUD (peptic ulcer disease)    Aortic valve regurgitation, acquired    Essential tremor    Vertigo    Nephrolithiasis    BMI 20.0-20.9, adult    Never smoked tobacco    Shortness of breath    Near syncope    Encounter for medication review and counseling    Encounter to discuss treatment options    Encounter to establish care with new doctor    Dizziness    Implantable loop recorder present    BMI less than 19,adult    H/O cardiac radiofrequency ablation

## 2025-06-17 NOTE — PATIENT INSTRUCTIONS
Great to see you today.   Please take your medications and or do life style behavior modifications as discussed.   Please make appointment in 12 months with Dr Cordero  Please call if you have any questions or concerns.  Please go to emergency department if you have abrupt onset of chest, shortness of breath, light headedness or dizziness.

## 2025-07-25 ENCOUNTER — HOSPITAL ENCOUNTER (OUTPATIENT)
Dept: CARDIOLOGY | Facility: HOSPITAL | Age: 80
Discharge: HOME | End: 2025-07-25
Payer: MEDICARE

## 2025-07-25 DIAGNOSIS — R00.2 PALPITATIONS: ICD-10-CM

## 2025-07-25 PROCEDURE — 93298 REM INTERROG DEV EVAL SCRMS: CPT

## 2025-08-29 ENCOUNTER — HOSPITAL ENCOUNTER (OUTPATIENT)
Dept: CARDIOLOGY | Facility: HOSPITAL | Age: 80
Discharge: HOME | End: 2025-08-29
Payer: MEDICARE

## 2025-08-29 DIAGNOSIS — R00.2 PALPITATIONS: ICD-10-CM

## 2025-08-29 PROCEDURE — 93298 REM INTERROG DEV EVAL SCRMS: CPT

## 2025-12-10 ENCOUNTER — APPOINTMENT (OUTPATIENT)
Dept: CARDIOLOGY | Facility: CLINIC | Age: 80
End: 2025-12-10
Payer: MEDICARE

## 2026-06-23 ENCOUNTER — APPOINTMENT (OUTPATIENT)
Dept: CARDIOLOGY | Facility: CLINIC | Age: 81
End: 2026-06-23
Payer: MEDICARE

## (undated) DEVICE — DRESSING, MEPILEX BORDER, POST-OP AG, 3.5 X 4 IN